# Patient Record
Sex: FEMALE | Race: WHITE | HISPANIC OR LATINO | Employment: FULL TIME | URBAN - METROPOLITAN AREA
[De-identification: names, ages, dates, MRNs, and addresses within clinical notes are randomized per-mention and may not be internally consistent; named-entity substitution may affect disease eponyms.]

---

## 2017-01-12 ENCOUNTER — OFFICE VISIT (OUTPATIENT)
Dept: ENDOCRINOLOGY | Facility: CLINIC | Age: 47
End: 2017-01-12
Payer: COMMERCIAL

## 2017-01-12 VITALS
WEIGHT: 167.56 LBS | BODY MASS INDEX: 26.3 KG/M2 | DIASTOLIC BLOOD PRESSURE: 70 MMHG | SYSTOLIC BLOOD PRESSURE: 110 MMHG | HEIGHT: 67 IN | HEART RATE: 80 BPM

## 2017-01-12 DIAGNOSIS — I10 ESSENTIAL HYPERTENSION: ICD-10-CM

## 2017-01-12 DIAGNOSIS — E04.2 MULTINODULAR GOITER: Primary | ICD-10-CM

## 2017-01-12 PROCEDURE — 99214 OFFICE O/P EST MOD 30 MIN: CPT | Mod: S$GLB,,, | Performed by: INTERNAL MEDICINE

## 2017-01-12 PROCEDURE — 3074F SYST BP LT 130 MM HG: CPT | Mod: S$GLB,,, | Performed by: INTERNAL MEDICINE

## 2017-01-12 PROCEDURE — 99999 PR PBB SHADOW E&M-EST. PATIENT-LVL III: CPT | Mod: PBBFAC,,, | Performed by: INTERNAL MEDICINE

## 2017-01-12 PROCEDURE — 1159F MED LIST DOCD IN RCRD: CPT | Mod: S$GLB,,, | Performed by: INTERNAL MEDICINE

## 2017-01-12 PROCEDURE — 3078F DIAST BP <80 MM HG: CPT | Mod: S$GLB,,, | Performed by: INTERNAL MEDICINE

## 2017-01-12 NOTE — PROGRESS NOTES
CHIEF COMPLAINT: Multinodular Goiter  45 year old being seen as a f/u. No difficulty swallowing. No change in voice      PAST MEDICAL HISTORY/PAST SURGICAL HISTORY:  Reviewed in EPIC    SOCIAL HISTORY: Smokes 1/2 ppd. No alcohol. 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 No DM.    MEDICATIONS/ALLERGIES: The patient's MedCard has been updated and reviewed.      ROS:   Constitutional: No recent significant weight change  Eyes: No recent visual changes  ENT: No dysphagia  Cardiovascular:Has had a holter for palpitations.   Respiratory: Denies current respiratory difficulty  Gastrointestinal: Denies recent bowel disturbances.   GenitoUrinary - No dysuria  Skin: No new skin rash  Neurologic: No focal neurologic complaints  Remainder ROS negative        PE:    GENERAL: Well developed, well nourished.  PSYCH:  appropriate mood and affect  EYES:  PERRL, EOM intact.  ENT: Nares patent, oropharynx clear, mucosa pink,   NECK: Supple, trachea midline, no palpable nodules  CHEST: Resp even and unlabored, CTA bilateral.  CARDIAC: RRR, S1, S2 heard, no murmurs, rubs, S3, or S4  ABDOMEN: Soft, non-tender, non-distended;  No organomegaly  VASCULAR:  DP pulses +2/4 bilaterally, no edema  NEURO: Gait steady, CN II-VII grossly intact  SKIN: No areas of breakdown, no acanthosis nigracans.      THYROID US:  ULTRASOUND THYROID 12/28/2016 at 1209    CPT: 54537    Clinical data:  Clinical history is provided of hypertension, goiter    Comparison: 12/21/2015    Ultrasound of the thyroid was performed.    Findings: The isthmus measures 0.17 cm. The right thyroid lobe is 3.7 x 1.3 x 1.2 cm. The left thyroid lobe is 4.2 x 0.8 x 1 cm. The thyroid echotexture is subtly heterogeneous with symmetric color Doppler flow bilaterally.  Multinodular changes are present similarly.  Measurements include hypoechoic nodule right superiorly 4 x 6 x 4 mm with well-defined margins similar, cyst right 6 x 5 x 6 mm similar,  left cyst anteriorly 4 x 5 x 3 mm similar, mixed hypoechoic cystic nodule left inferiorly 5 x 8 x 4 mm similar measured currently as well as smaller adjacent. Correlate overall with the patient's clinical findings and laboratory values. No significant interval changes are appreciated.       Impression      Multinodular, cystic goiter changes are present similar overall as compared to the previous study measured currently.  One of the previously visualized nodules on the right is not appreciated currently as compared to the previous study.         Results for ESTELLA DAVID (MRN [id number]) as of 1/12/2017 16:02   Ref. Range 12/17/2016 09:58   TSH Latest Ref Range: 0.400 - 4.000 uIU/mL 0.800         ASSESSMENT/PLAN:  1. Multinodular Goiter- TSH WNL. No significant change in size of nodules. F/U 1 year with TSH, thyroid US    2. HTN- controlled.     FOLLOWUP  F/u 1 year with TSH, Thyroid US

## 2017-12-22 ENCOUNTER — TELEPHONE (OUTPATIENT)
Dept: NEUROLOGY | Facility: CLINIC | Age: 47
End: 2017-12-22

## 2017-12-22 NOTE — TELEPHONE ENCOUNTER
----- Message from Sofia Katz sent at 12/22/2017  9:47 AM CST -----  Schedule a new patient appointment.  Please call patient at [phone]

## 2017-12-26 ENCOUNTER — TELEPHONE (OUTPATIENT)
Dept: NEUROLOGY | Facility: CLINIC | Age: 47
End: 2017-12-26

## 2017-12-26 NOTE — TELEPHONE ENCOUNTER
----- Message from Nate Monge sent at 12/26/2017  4:16 PM CST -----  Contact: patient  Place call to pod,Patient returning call.please call back at [phone]. Thanks,

## 2017-12-26 NOTE — TELEPHONE ENCOUNTER
Attempted to reach by patient by both her contacts without avail. Left messabe on contacts voice mail.

## 2017-12-26 NOTE — TELEPHONE ENCOUNTER
Returned call and spoke with patient. Appointment scheduled for 2/22 at 8 am. Directions given to [address] [place], [place] [address]. Patient verbalized understanding.

## 2017-12-27 ENCOUNTER — TELEPHONE (OUTPATIENT)
Dept: ENDOCRINOLOGY | Facility: CLINIC | Age: 47
End: 2017-12-27

## 2018-01-15 ENCOUNTER — PATIENT MESSAGE (OUTPATIENT)
Dept: ENDOCRINOLOGY | Facility: CLINIC | Age: 48
End: 2018-01-15

## 2018-01-16 DIAGNOSIS — E04.2 MULTINODULAR GOITER: Primary | ICD-10-CM

## 2018-02-05 ENCOUNTER — OFFICE VISIT (OUTPATIENT)
Dept: ENDOCRINOLOGY | Facility: CLINIC | Age: 48
End: 2018-02-05
Payer: COMMERCIAL

## 2018-02-05 VITALS
BODY MASS INDEX: 27.14 KG/M2 | SYSTOLIC BLOOD PRESSURE: 138 MMHG | HEART RATE: 70 BPM | WEIGHT: 172.94 LBS | DIASTOLIC BLOOD PRESSURE: 78 MMHG | HEIGHT: 67 IN

## 2018-02-05 DIAGNOSIS — I10 BENIGN ESSENTIAL HTN: ICD-10-CM

## 2018-02-05 DIAGNOSIS — E04.2 MULTINODULAR GOITER: Primary | ICD-10-CM

## 2018-02-05 PROCEDURE — 99999 PR PBB SHADOW E&M-EST. PATIENT-LVL III: CPT | Mod: PBBFAC,,, | Performed by: INTERNAL MEDICINE

## 2018-02-05 PROCEDURE — 99213 OFFICE O/P EST LOW 20 MIN: CPT | Mod: S$GLB,,, | Performed by: INTERNAL MEDICINE

## 2018-02-05 PROCEDURE — 3008F BODY MASS INDEX DOCD: CPT | Mod: S$GLB,,, | Performed by: INTERNAL MEDICINE

## 2018-02-05 NOTE — PROGRESS NOTES
CHIEF COMPLAINT: Multinodular Goiter  48 year old being seen as a f/u. No difficulty swallowing. No change in voice. No palpitations. No tremors. Had the flu last week.       PAST MEDICAL HISTORY/PAST SURGICAL HISTORY:  Reviewed in [facility]    SOCIAL HISTORY: Smokes 1/2 ppd. No alcohol. 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 No DM.    MEDICATIONS/ALLERGIES: The patient's MedCard has been updated and reviewed.      ROS:   Constitutional: No recent significant weight change  Eyes: No recent visual changes  ENT: No dysphagia  Cardiovascular:Has had a holter for palpitations.   Respiratory: Denies current respiratory difficulty  Gastrointestinal: Denies recent bowel disturbances.   GenitoUrinary - No dysuria  Skin: No new skin rash  Neurologic: No focal neurologic complaints  Remainder ROS negative        PE:    GENERAL: Well developed, well nourished.  NECK: Supple, trachea midline, no palpable nodules  CHEST: Resp even and unlabored, CTA bilateral.  CARDIAC: RRR, S1, S2 heard, no murmurs, rubs, S3, or S4    Results for ESTELLA DAVID (MRN [id number]) as of 2/5/2018 16:06   Ref. Range 12/26/2017 11:11   TSH Latest Ref Range: 0.400 - 4.000 uIU/mL 1.420     THYROID US:  Ultrasound thyroid    Indication: Followup nodules.    Comparison: 12/28/2016    Findings: The right thyroid lobe measures 4.1 x 1.4 x 1.2 cm.  Left lower lobe measures 3.3 x 0.8 x 1.1 cm.  The thyroid isthmus measures 2 mm in thickness.  The thyroid gland is homogenous in echotexture.  There is a 5 x 6 x 3 mm cystic nodule in the superior portion of the right thyroid lobe.  There is a solid 6 x 5 x 3 mm nodule in the inferior pole of the right thyroid lobe.  There is a 4 x 5 x 4 mm cystic nodule in the mid right thyroid lobe.  There is a 5 x 6 x 3 mm cystic nodule in the left mid thyroid lobe.  There is a 5 x 8 x 4 mm solid nodule in the inferior left thyroid lobe.  These are not significantly changed when compared to  prior.   Impression      Stable subcentimeter thyroid nodules.           ASSESSMENT/PLAN:  1. Multinodular Goiter- TSH WNL. No significant change in size of nodules. F/U 2 year with TSH, thyroid US    2. HTN- controlled.     FOLLOWUP  F/u 2 year with TSH, Thyroid US

## 2018-02-19 ENCOUNTER — TELEPHONE (OUTPATIENT)
Dept: NEUROLOGY | Facility: CLINIC | Age: 48
End: 2018-02-19

## 2018-02-19 NOTE — TELEPHONE ENCOUNTER
Called patient and left message in regards to rescheduling appointment. Patient was scheduled on 2/22/18 but Dr. Bhandari will not be in that day. Left message informing patient I was going to reschedule appointment to 3/2/18. Stated in message that if this was a problem and they could not be seen on that day just to call us back and id be happy to reschedule them to another day.

## 2018-02-19 NOTE — TELEPHONE ENCOUNTER
----- Message from Caesar Rider sent at 2/19/2018  2:38 PM CST -----  Contact: self   Patient want to speak with Katherine regarding rescheduling her appointment, patient stated she needs something the first week in April. Please call back at [phone]

## 2018-04-06 ENCOUNTER — OFFICE VISIT (OUTPATIENT)
Dept: NEUROLOGY | Facility: CLINIC | Age: 48
End: 2018-04-06
Payer: COMMERCIAL

## 2018-04-06 VITALS
DIASTOLIC BLOOD PRESSURE: 80 MMHG | RESPIRATION RATE: 18 BRPM | SYSTOLIC BLOOD PRESSURE: 120 MMHG | HEART RATE: 79 BPM | HEIGHT: 67 IN | WEIGHT: 177.38 LBS | BODY MASS INDEX: 27.84 KG/M2

## 2018-04-06 DIAGNOSIS — Z86.2 HISTORY OF IRON DEFICIENCY ANEMIA: ICD-10-CM

## 2018-04-06 DIAGNOSIS — R93.0 ABNORMAL MRI OF HEAD: Primary | ICD-10-CM

## 2018-04-06 DIAGNOSIS — K44.9 HIATAL HERNIA: ICD-10-CM

## 2018-04-06 DIAGNOSIS — F41.9 ANXIETY: ICD-10-CM

## 2018-04-06 DIAGNOSIS — E61.1 IRON DEFICIENCY: ICD-10-CM

## 2018-04-06 DIAGNOSIS — E04.2 MULTINODULAR GOITER: ICD-10-CM

## 2018-04-06 DIAGNOSIS — E55.9 VITAMIN D DEFICIENCY: ICD-10-CM

## 2018-04-06 DIAGNOSIS — I10 ESSENTIAL HYPERTENSION: ICD-10-CM

## 2018-04-06 PROCEDURE — 99204 OFFICE O/P NEW MOD 45 MIN: CPT | Mod: S$GLB,,, | Performed by: PSYCHIATRY & NEUROLOGY

## 2018-04-06 PROCEDURE — 3074F SYST BP LT 130 MM HG: CPT | Mod: CPTII,S$GLB,, | Performed by: PSYCHIATRY & NEUROLOGY

## 2018-04-06 PROCEDURE — 3079F DIAST BP 80-89 MM HG: CPT | Mod: CPTII,S$GLB,, | Performed by: PSYCHIATRY & NEUROLOGY

## 2018-04-06 PROCEDURE — 99999 PR PBB SHADOW E&M-EST. PATIENT-LVL III: CPT | Mod: PBBFAC,,, | Performed by: PSYCHIATRY & NEUROLOGY

## 2018-04-06 NOTE — PROGRESS NOTES
Headache questionnaire    1. When did your Headaches start?    2013      2. Where are your headaches located?   left      3. Your headache's characteristics:   Throbbing, Pounding    4. How long does the headache last?   (Patient did not answer)      5. How often does the headache occur?   daily      6. Are your headaches preceded or accompanied by other symptoms? yes   If yes, please describe.  Anxiety, numbness      7. Does the headache awaken you at night? no   If so, how often? N/A        8. Please jabari the word that best describes your headache's intensity:    (Patient did not answer)      9. Using a scale of 1 through 10, with 0 = no pain and 10 = the worst pain:   What score is your headache now? 0   What score is your headache at its worst? 6   What score is your headache at its best? 0        10. Possible associated headache symptoms:  [x]  Sensitivity to light  [] Dizziness  [x] Nasal or sinus pressure/ pain   [x] Sensitivity to noise  [] Vertigo  [] Problems with concentration  [x] Sensitivity to smells  [] Ringing in ears  [] Problems with memory    [x] Blurred vision  [x] Irritability  [] Problems with task completion   [] Double vision  [] Anger  []  Problems with relaxation  [] Loss of appetite  [x] Anxiety  [x] Neck tightness, Neck pain  [] Nausea   [x] Nasal congestion  [] Vomiting         11. Headache improving factors:  [x] Sleep  [] Heat  [] Darkness  [] Ice  [] Local pressure [] Menses (period)  [x] Massage   [] Medications:        12. Headache worsening factors:   [x] Fatigue [] Sneezing  [] Changes in Weather  [] Light [] Bending Over [] Stress  [] Noise [] Ovulation  [] Multiple Sclerosis Flare-Up  [] Smells  [] Menses  [] Food   [] Coughing [] Alcohol      13. Number of caffeinated drinks per day: 2      14. Number of diet drinks per day:  0      15. Have you seen any other [facility]ists within the last 3 years?  no

## 2018-04-06 NOTE — PROGRESS NOTES
Subjective:       Patient ID: Jomar Mercer is a 48 y.o. female.    Chief Complaint: Migraine    HPI  The patient is a pleasant 49 y/o female presenting with chief complaint of left supraorbital, high frontal throbbing sensation without pain. She also feels a mild sensation of numbness. The symptoms are present about 30% of the time untreated and about 10% of the time with the application of essential oils. She has suffered with this since 2013, followed at [facility] and diagnosed with Migraine. She comes with a CD containing an MRI and MRA of the brain for review. I have also reviewed previous records from [facility]. She has had Cardiac work up including a stress test and an 2D ECHO both within normal limits. She has history of hypertension and is on Propranolol 10 mg BID. She admits that stress is a trigger as well as fatigue. She is a  at [employer]. Her PMHx is significant for a thyroid nodule, subcentimeter, followed by Dr. Mora. She is a smoker, trying to quit planning on starting Chantix. She has tried Gabapentin, Clonazepam, Cymbalta, and Elavil. Currently on low dose Clonazepam for anxiety  Please see details of headache characteristics below:  Headache questionnaire     1. When did your Headaches start?               2013        2. Where are your headaches located?              left        3. Your headache's characteristics:              Throbbing, Pounding     4. How long does the headache last?              (Patient did not answer)        5. How often does the headache occur?              daily        6. Are your headaches preceded or accompanied by other symptoms? yes              If yes, please describe.  Anxiety, numbness        7. Does the headache awaken you at night? no              If so, how often? N/A           8. Please jabari the word that best describes your headache's intensity:               (Patient did not answer)        9. Using a scale of 1 through 10, with 0 = no  pain and 10 = the worst pain:              What score is your headache now? 0              What score is your headache at its worst? 6              What score is your headache at its best? 0           10. Possible associated headache symptoms:  [x]  Sensitivity to light                  [] Dizziness                    [x] Nasal or sinus pressure/ pain              [x] Sensitivity to noise                 [] Vertigo                        [] Problems with concentration  [x] Sensitivity to smells   [] Ringing in ears           [] Problems with memory                        [x] Blurred vision             [x] Irritability                     [] Problems with task completion   [] Double vision             [] Anger              []  Problems with relaxation  [] Loss of appetite                     [x] Anxiety                       [x] Neck tightness, Neck pain  [] Nausea                                   [x] Nasal congestion  [] Vomiting                                         11. Headache improving factors:  [x] Sleep              [] Heat  [] Darkness                    [] Ice  [] Local pressure           [] Menses (period)  [x] Massage                    [] Medications:          12. Headache worsening factors:   [x] Fatigue           [] Sneezing                    [] Changes in Weather  [] Light   [] Bending Over [] Stress  [] Noise  [] Ovulation                    [] Multiple Sclerosis Flare-Up  [] Smells            [] Menses                      [] Food   [] Coughing        [] Alcohol        13. Number of caffeinated drinks per day: 2        14. Number of diet drinks per day:  0          Review of Systems   Constitutional: Positive for fatigue. Negative for activity change, appetite change and fever.   HENT: Negative for congestion, dental problem, hearing loss, sinus pressure, tinnitus, trouble swallowing and voice change.    Eyes: Negative for photophobia, pain, redness and visual disturbance.   Respiratory: Negative  "for cough, chest tightness and shortness of breath.    Cardiovascular: Positive for palpitations. Negative for chest pain and leg swelling.   Gastrointestinal: Negative for abdominal pain, blood in stool, nausea and vomiting.   Endocrine: Negative for cold intolerance and heat intolerance.   Genitourinary: Negative for difficulty urinating, frequency, menstrual problem and urgency.   Musculoskeletal: Negative for arthralgias, back pain, gait problem, joint swelling, myalgias, neck pain and neck stiffness.   Skin: Negative.    Neurological: Positive for numbness. Negative for dizziness, tremors, seizures, syncope, facial asymmetry, speech difficulty, weakness, light-headedness and headaches.   Hematological: Negative for adenopathy. Does not bruise/bleed easily.   Psychiatric/Behavioral: Negative for agitation, behavioral problems, confusion, decreased concentration, self-injury, sleep disturbance and suicidal ideas. The patient is not nervous/anxious and is not hyperactive.                Past Medical History:   Diagnosis Date    a Family H/O CAD     "SEs q5yr At Age 50"    a Palpitations     Dr. Roge Johnson 2015 Evaluation Was Negative    b Hypertension     5/9/16 RXd Low Salt Diet    d Family H/O DM     e Multinodular Goiter     Dr. Evan Mora Monitors With Yearly U/S    g Chronic Iron Deficiency     12/27/17 RXd An OTC "MVI With Iron" 1 Tab Daily; Likely From Menstrual Loss    g H/O Iron Deficiency Anemia     Her CBCs Are Normal On Daily PO Iron; Likely From Menstrual Loss    i 1/2 PPD X 20 YRs TUD     5/9/16 RXd OTC 2 Mg Nicotine Gum PRN    j Gallstone     7/26/16 Referred To Dr. Selma Crane, But She Is Deferring Sx For Now; 4/20/15 ABD U/S = 1 Mobile Gallstone W/O Inflammation    j GERD     Dr. Yovanny Robison; 6/8/15 EGD (Dr. MERNA Chun) = Normal Except For A Small Hiatal Hernia    j Mildly Elevated ALT Levels     Dr. Yovanny Robison; 12/23/16 RXd OTC Vitamin E 400 IU Daily; 5/27/16 HepABC And Ferritin " = Normal/Not Elevated    j Small Hiatal Hernia     Dr. Yovanny Robison; 6/8/15 EGD (Dr. MERNA Chun) = Normal Except For A Small Hiatal Hernia    m Migraines     16 Referred To Dr. Colin Carreon For 2nd Opinion; Dr. Marino ([facility] [place]); On Clonazepam And Elavil For This    n Anxiety     On Clonazepam And Elavil For This    o Allergic Rhinosinusitis     q Vitamin D Deficiency     16 Increased Her OTC D3 To 5K IU Daily    Wellness Visit 2017      Past Surgical History:   Procedure Laterality Date     SECTION       Family History   Problem Relation Age of Onset    Stroke Mother     Hypertension Mother     Stroke Father     Heart disease Father     Hypertension Father     Kidney disease Father     Cancer Paternal Aunt      breast    Stroke Maternal Grandmother     Heart disease Paternal Grandfather      Social History     Social History    Marital status:      Spouse name: N/A    Number of children: N/A    Years of education: N/A     Occupational History    Not on file.     Social History Main Topics    Smoking status: Former Smoker     Packs/day: 0.25     Years: 25.00     Quit date: 2018    Smokeless tobacco: Never Used    Alcohol use No    Drug use: No    Sexual activity: Not on file     Other Topics Concern    Not on file     Social History Narrative    No narrative on file     Review of patient's allergies indicates:   Allergen Reactions    No known drug allergies        Current Outpatient Prescriptions:     clonazePAM (KLONOPIN) 0.5 MG tablet, Take 0.5 tablets (0.25 mg total) by mouth every evening., Disp: 45 tablet, Rfl: 1    fluticasone (FLONASE) 50 mcg/actuation nasal spray, SPRAY 2 SPRAYS IN EACH NOSTRIL ONCE DAILY, Disp: 54 g, Rfl: 1    levocetirizine (XYZAL) 5 MG tablet, TAKE 1 TABLET (5 MG TOTAL) BY MOUTH AS NEEDED FOR ALLERGIES., Disp: 90 tablet, Rfl: 1    pantoprazole (PROTONIX) 40 MG tablet, Take 40 mg by mouth once daily.,  Disp: , Rfl: 2    propranolol (INDERAL) 10 MG tablet, Take 1 tablet (10 mg total) by mouth 2 (two) times daily., Disp: 180 tablet, Rfl: 1    sod bicarb-sod chlor-neti pot pkdv, 1 Bottle by Nasal route 2 (two) times daily., Disp: , Rfl:     iron-FA-K44-L-nlitafc sodium 93-4--50 mg-mg-mcg-mg-mg Tab, Take 1 tablet by mouth once daily., Disp: 90 tablet, Rfl: 0    multivitamin with iron Tab, Take 1 tablet by mouth once daily., Disp: , Rfl: 0      Objective:      Vitals:    04/06/18 0825   BP: 120/80   Pulse: 79   Resp: 18     Body mass index is 27.78 kg/m².    Physical Exam    Constitutional:   She appears well-developed and well-nourished. She is well groomed    HENT:    Head: Atraumatic, oral and nasal mucosa intact  Eyes: Conjunctivae and EOM are normal. Pupils are equal, round, and reactive to light OU  Neck: Neck supple. No thyromegaly present  Cardiovascular: Normal rate and normal heart sounds  No murmur heard  Pulmonary/Chest: Effort normal and breath sounds normal  Musculoskeletal: Normal range of motion. No joint stiffness. No vertebral point tenderness  Skin: Skin is warm and dry  Psychiatric: Normal mood and affect     Neuro exam:    Mental status:  Awake, attentive, Alert, oriented to self, place, year and month  Language function is intact    Cranial Nerves:  Smell was not formally evaluated  Cranial Nerves II - XII: intact  Pursuits were smooth, normal saccades, no nystagmus OU  Motor - facial movement was symmetrical and normal     Palate moved well and was symmetrical with normal palatal and oral sensation  Tongue movements were full    Coordination:     Rapid alternating movements and rapid finger tapping - normal bilaterally  Finger to nose - normal and symmetric bilaterally   Heel to shin test - normal and symmetric bilaterally   Arm roll - smooth and symmetric   No intentional or positional tremor.     Motor:  Normal muscle bulk and symmetry. No fasciculations were noted    No pronator  drift  Strength 5/5 bilaterally     Reflexes:  Tendon reflexes were 3 + at biceps, triceps, brachioradialis, patellar, and Achilles bilaterally  On plantar stimulation toes were down going bilaterally  No clonus was noted     Sensory:Decreased 20% to pp on the left hemibody including the face.     Gait: Romberg absent. Normal gait. Normal arm swing and turns. Good tandem    Review of Data:  Lab Results   Component Value Date     12/26/2017     06/18/2015    K 3.8 12/26/2017    K 4.2 06/18/2015    MG 2.1 06/18/2015     12/26/2017     06/18/2015    CO2 27 12/26/2017    BUN 14 12/26/2017    CREATININE 0.70 12/26/2017    CREATININE 0.72 06/18/2015    GLU 90 12/26/2017    HGBA1C 5.2 12/26/2017    AST 19 12/26/2017    AST 20 03/21/2016    ALT 32 12/26/2017    ALBUMIN 4.1 12/26/2017    ALBUMIN 4.3 06/18/2015    PROT 7.0 12/26/2017    BILITOT 0.6 12/26/2017    CHOL 196 12/26/2017    HDL 68 12/26/2017    LDLCALC 112.6 12/26/2017    TRIG 77 12/26/2017       Lab Results   Component Value Date    WBC 4.93 12/26/2017    HGB 11.8 (L) 12/26/2017    HCT 37.6 12/26/2017    MCV 86 12/26/2017     12/26/2017       Lab Results   Component Value Date    TSH 1.420 12/26/2017    TSH 1.420 12/26/2017     I reviewed images of MRA and MRI of the brain and shared the images with the patient. There are multiple foci of white matter lesions likely representing gliosis.        Assessment and Plan   The patient has a headache NOS but with multiple migraine features like the throbbing nature of the symptoms and the associated photophobia, phonophobia, osmophobia (highly specific for migraine), improvement with sleep.  She has a negative cardiac work up. Given the MRI findings, I will obtain a hypercoagulable work up although the history of hypertension, smoking and migraine type headache could explain these findings.  Start 81 mg ASA after blood drawn for hypercoagulable work up  Anxiety, on clonazepam  Sub centimeter  thyroid nodule, followed by Dr. Mora  Hypertension, on propranolol 10 mg BID  RTC in 3 months          Loree Bhandari M.D  Medical Director, Headache and Facial Pain  [facility]

## 2018-04-06 NOTE — LETTER
April 6, 2018      Chris Medrano MD  [id number]  [address]  [place] [id number]           [facility] [place]  [address] [facility] [address] [place] [address]  Phone: [phone]  Fax: [phone]          Patient: Jomar Mercer   MR Number: [id number]   YOB: 1970   Date of Visit: 4/6/2018       Dear Dr. Chris Medrano:    Thank you for referring Jomar Mercer to me for evaluation. Attached you will find relevant portions of my assessment and plan of care.    If you have questions, please do not hesitate to call me. I look forward to following Jomar Mercer along with you.    Sincerely,    Nila Bhandari MD    Enclosure  CC:  No Recipients    If you would like to receive this communication electronically, please contact [email or web address] or [phone] to request more information on [employer] access.    For providers and/or their staff who would like to refer a patient to [facility], please contact us through our one-stop-shop provider referral line, [facility], at [phone].    If you feel you have received this communication in error or would no longer like to receive these types of communications, please e-mail [email or web address]

## 2018-07-10 ENCOUNTER — OFFICE VISIT (OUTPATIENT)
Dept: NEUROLOGY | Facility: CLINIC | Age: 48
End: 2018-07-10
Payer: COMMERCIAL

## 2018-07-10 VITALS
RESPIRATION RATE: 17 BRPM | WEIGHT: 176.56 LBS | DIASTOLIC BLOOD PRESSURE: 78 MMHG | SYSTOLIC BLOOD PRESSURE: 121 MMHG | HEART RATE: 75 BPM | BODY MASS INDEX: 27.71 KG/M2 | HEIGHT: 67 IN

## 2018-07-10 DIAGNOSIS — K44.9 HIATAL HERNIA: ICD-10-CM

## 2018-07-10 DIAGNOSIS — I10 ESSENTIAL HYPERTENSION: ICD-10-CM

## 2018-07-10 DIAGNOSIS — G43.019 INTRACTABLE MIGRAINE WITHOUT AURA AND WITHOUT STATUS MIGRAINOSUS: Primary | ICD-10-CM

## 2018-07-10 DIAGNOSIS — F41.9 ANXIETY: ICD-10-CM

## 2018-07-10 DIAGNOSIS — E04.2 MULTINODULAR GOITER: ICD-10-CM

## 2018-07-10 DIAGNOSIS — Z86.2 HISTORY OF IRON DEFICIENCY ANEMIA: ICD-10-CM

## 2018-07-10 DIAGNOSIS — E55.9 VITAMIN D DEFICIENCY: ICD-10-CM

## 2018-07-10 PROCEDURE — 3078F DIAST BP <80 MM HG: CPT | Mod: CPTII,S$GLB,, | Performed by: PSYCHIATRY & NEUROLOGY

## 2018-07-10 PROCEDURE — 99214 OFFICE O/P EST MOD 30 MIN: CPT | Mod: S$GLB,,, | Performed by: PSYCHIATRY & NEUROLOGY

## 2018-07-10 PROCEDURE — 99999 PR PBB SHADOW E&M-EST. PATIENT-LVL III: CPT | Mod: PBBFAC,,, | Performed by: PSYCHIATRY & NEUROLOGY

## 2018-07-10 PROCEDURE — 3074F SYST BP LT 130 MM HG: CPT | Mod: CPTII,S$GLB,, | Performed by: PSYCHIATRY & NEUROLOGY

## 2018-07-10 PROCEDURE — 3008F BODY MASS INDEX DOCD: CPT | Mod: CPTII,S$GLB,, | Performed by: PSYCHIATRY & NEUROLOGY

## 2018-07-10 RX ORDER — CYPROHEPTADINE HYDROCHLORIDE 4 MG/1
TABLET ORAL
Qty: 30 TABLET | Refills: 5 | Status: SHIPPED | OUTPATIENT
Start: 2018-07-10 | End: 2018-10-04 | Stop reason: SDUPTHER

## 2018-07-10 NOTE — PROGRESS NOTES
Subjective:       Patient ID: Jomar Mercer is a 48 y.o. female.    Chief Complaint: Migraine  INTERVAL HISTORY  The patient comes for follow up. She is a little worse. She is on vacation from school and finds that not working is actually associated with increased headaches. She is on Klonopin 0.25 mg QHS for sleep and is willing to come off it. She is here to discuss options.  HPI  The patient is a pleasant 47 y/o female presenting with chief complaint of left supraorbital, high frontal throbbing sensation without pain. She also feels a mild sensation of numbness. The symptoms are present about 30% of the time untreated and about 10% of the time with the application of essential oils. She has suffered with this since 2013, followed at [facility] and diagnosed with Migraine. She comes with a CD containing an MRI and MRA of the brain for review. I have also reviewed previous records from [facility]. She has had Cardiac work up including a stress test and an 2D ECHO both within normal limits. She has history of hypertension and is on Propranolol 10 mg BID. She admits that stress is a trigger as well as fatigue. She is a  at [employer]. Her PMHx is significant for a thyroid nodule, subcentimeter, followed by Dr. Mora. She is a smoker, trying to quit planning on starting Chantix. She has tried Gabapentin, Clonazepam, Cymbalta, and Elavil. Currently on low dose Clonazepam for anxiety  Please see details of headache characteristics below:  Headache questionnaire     1. When did your Headaches start?               2013        2. Where are your headaches located?              left        3. Your headache's characteristics:              Throbbing, Pounding     4. How long does the headache last?              (Patient did not answer)        5. How often does the headache occur?              daily        6. Are your headaches preceded or accompanied by other symptoms? yes              If yes, please  describe.  Anxiety, numbness        7. Does the headache awaken you at night? no              If so, how often? N/A           8. Please jabari the word that best describes your headache's intensity:               (Patient did not answer)        9. Using a scale of 1 through 10, with 0 = no pain and 10 = the worst pain:              What score is your headache now? 0              What score is your headache at its worst? 6              What score is your headache at its best? 0           10. Possible associated headache symptoms:  [x]  Sensitivity to light                  [] Dizziness                    [x] Nasal or sinus pressure/ pain              [x] Sensitivity to noise                 [] Vertigo                        [] Problems with concentration  [x] Sensitivity to smells   [] Ringing in ears           [] Problems with memory                        [x] Blurred vision             [x] Irritability                     [] Problems with task completion   [] Double vision             [] Anger              []  Problems with relaxation  [] Loss of appetite                     [x] Anxiety                       [x] Neck tightness, Neck pain  [] Nausea                                   [x] Nasal congestion  [] Vomiting                                         11. Headache improving factors:  [x] Sleep              [] Heat  [] Darkness                    [] Ice  [] Local pressure           [] Menses (period)  [x] Massage                    [] Medications:          12. Headache worsening factors:   [x] Fatigue           [] Sneezing                    [] Changes in Weather  [] Light   [] Bending Over [] Stress  [] Noise  [] Ovulation                    [] Multiple Sclerosis Flare-Up  [] Smells            [] Menses                      [] Food   [] Coughing        [] Alcohol        13. Number of caffeinated drinks per day: 2        14. Number of diet drinks per day:  0          Review of Systems   Constitutional: Positive for  "fatigue. Negative for activity change, appetite change and fever.   HENT: Negative for congestion, dental problem, hearing loss, sinus pressure, tinnitus, trouble swallowing and voice change.    Eyes: Negative for photophobia, pain, redness and visual disturbance.   Respiratory: Negative for cough, chest tightness and shortness of breath.    Cardiovascular: Positive for palpitations. Negative for chest pain and leg swelling.   Gastrointestinal: Negative for abdominal pain, blood in stool, nausea and vomiting.   Endocrine: Negative for cold intolerance and heat intolerance.   Genitourinary: Negative for difficulty urinating, frequency, menstrual problem and urgency.   Musculoskeletal: Negative for arthralgias, back pain, gait problem, joint swelling, myalgias, neck pain and neck stiffness.   Skin: Negative.    Neurological: Positive for numbness. Negative for dizziness, tremors, seizures, syncope, facial asymmetry, speech difficulty, weakness, light-headedness and headaches.   Hematological: Negative for adenopathy. Does not bruise/bleed easily.   Psychiatric/Behavioral: Negative for agitation, behavioral problems, confusion, decreased concentration, self-injury, sleep disturbance and suicidal ideas. The patient is not nervous/anxious and is not hyperactive.                Past Medical History:   Diagnosis Date    a Family H/O CAD     "SEs q5yr At Age 50"    a Palpitations     Dr. Roge Johnson 2015 Evaluation Was Negative    b Hypertension     5/9/16 RXd Low Salt Diet    d Family H/O DM     e Multinodular Goiter     Dr. Evan Mora Monitors With Yearly U/S    g Chronic Iron Deficiency     12/27/17 RXd An OTC "MVI With Iron" 1 Tab Daily; Likely From Menstrual Loss    g H/O Iron Deficiency Anemia     Her CBCs Are Normal On Daily PO Iron; Likely From Menstrual Loss    i 1/2 PPD X 20 YRs TUD     5/9/16 RXd OTC 2 Mg Nicotine Gum PRN    j Gallstone     7/26/16 Referred To Dr. Selma Crane, But She Is Deferring Sx " For Now; 4/20/15 ABD U/S = 1 Mobile Gallstone W/O Inflammation    j GERD     Dr. Yovanny Robison; 6/8/15 EGD (Dr. MERNA Chun) = Normal Except For A Small Hiatal Hernia    j Mildly Elevated ALT Levels     Dr. Yovanny Robison; 16 RXd OTC Vitamin E 400 IU Daily; 16 HepABC And Ferritin = Normal/Not Elevated    j Small Hiatal Hernia     Dr. Yovanny Robison; 6/8/15 EGD (Dr. MERNA Chun) = Normal Except For A Small Hiatal Hernia    m Migraines     16 Referred To Dr. Colin Carreon For 2nd Opinion; Dr. Marino ([facility] [place]); On Clonazepam And Elavil For This    n Anxiety     On Clonazepam And Elavil For This    o Allergic Rhinosinusitis     q Vitamin D Deficiency     16 Increased Her OTC D3 To 5K IU Daily    Wellness Visit 2017      Past Surgical History:   Procedure Laterality Date     SECTION       Family History   Problem Relation Age of Onset    Stroke Mother     Hypertension Mother     Stroke Father     Heart disease Father     Hypertension Father     Kidney disease Father     Cancer Paternal Aunt      breast    Stroke Maternal Grandmother     Heart disease Paternal Grandfather      Social History     Social History    Marital status:      Spouse name: N/A    Number of children: N/A    Years of education: N/A     Occupational History    Not on file.     Social History Main Topics    Smoking status: Former Smoker     Packs/day: 0.25     Years: 25.00     Quit date: 2018    Smokeless tobacco: Never Used    Alcohol use No    Drug use: No    Sexual activity: Not on file     Other Topics Concern    Not on file     Social History Narrative    No narrative on file     Review of patient's allergies indicates:   Allergen Reactions    No known drug allergies        Current Outpatient Prescriptions:     clonazePAM (KLONOPIN) 0.5 MG tablet, Take 0.5 tablets (0.25 mg total) by mouth every evening., Disp: 45 tablet, Rfl: 1    fluticasone (FLONASE) 50  mcg/actuation nasal spray, SPRAY 2 SPRAYS IN EACH NOSTRIL ONCE DAILY, Disp: 54 g, Rfl: 1    levocetirizine (XYZAL) 5 MG tablet, TAKE 1 TABLET (5 MG TOTAL) BY MOUTH AS NEEDED FOR ALLERGIES., Disp: 90 tablet, Rfl: 1    pantoprazole (PROTONIX) 40 MG tablet, Take 40 mg by mouth once daily., Disp: , Rfl: 2    propranolol (INDERAL) 10 MG tablet, Take 1 tablet (10 mg total) by mouth 2 (two) times daily., Disp: 180 tablet, Rfl: 1    sod bicarb-sod chlor-neti pot pkdv, 1 Bottle by Nasal route 2 (two) times daily., Disp: , Rfl:     iron-FA-J30-G-dxaoyvt sodium 79-2--50 mg-mg-mcg-mg-mg Tab, Take 1 tablet by mouth once daily., Disp: 90 tablet, Rfl: 0    multivitamin with iron Tab, Take 1 tablet by mouth once daily., Disp: , Rfl: 0      Objective:      Vitals:    07/10/18 1434   BP: 121/78   Pulse: 75   Resp: 17     Body mass index is 27.66 kg/m².      Physical Exam    Constitutional:   She appears well-developed and well-nourished. She is well groomed    HENT:    Head: Atraumatic, oral and nasal mucosa intact  Eyes: Conjunctivae and EOM are normal. Pupils are equal, round, and reactive to light OU  Neck: Neck supple. No thyromegaly present  Cardiovascular: Normal rate and normal heart sounds  No murmur heard  Pulmonary/Chest: Effort normal and breath sounds normal  Musculoskeletal: Normal range of motion. No joint stiffness. No vertebral point tenderness  Skin: Skin is warm and dry  Psychiatric: Normal mood and affect     Neuro exam:    Mental status:  Awake, attentive, Alert, oriented to self, place, year and month  Language function is intact    Cranial Nerves:  Smell was not formally evaluated  Cranial Nerves II - XII: intact  Pursuits were smooth, normal saccades, no nystagmus OU  Motor - facial movement was symmetrical and normal     Palate moved well and was symmetrical with normal palatal and oral sensation  Tongue movements were full    Coordination:     Rapid alternating movements and rapid finger tapping -  normal bilaterally  Finger to nose - normal and symmetric bilaterally   Heel to shin test - normal and symmetric bilaterally   Arm roll - smooth and symmetric   No intentional or positional tremor.     Motor:  Normal muscle bulk and symmetry. No fasciculations were noted    No pronator drift  Strength 5/5 bilaterally     Reflexes:  Tendon reflexes were 3 + at biceps, triceps, brachioradialis, patellar, and Achilles bilaterally  On plantar stimulation toes were down going bilaterally  No clonus was noted     Sensory:Decreased 20% to pp on the left hemibody including the face.     Gait: Romberg absent. Normal gait. Normal arm swing and turns. Good tandem    Review of Data:  Lab Results   Component Value Date     12/26/2017     06/18/2015    K 3.8 12/26/2017    K 4.2 06/18/2015    MG 2.1 06/18/2015     12/26/2017     06/18/2015    CO2 27 12/26/2017    BUN 14 12/26/2017    CREATININE 0.70 12/26/2017    CREATININE 0.72 06/18/2015    GLU 90 12/26/2017    HGBA1C 5.2 12/26/2017    AST 19 12/26/2017    AST 20 03/21/2016    ALT 32 12/26/2017    ALBUMIN 4.1 12/26/2017    ALBUMIN 4.3 06/18/2015    PROT 7.0 12/26/2017    BILITOT 0.6 12/26/2017    CHOL 196 12/26/2017    HDL 68 12/26/2017    LDLCALC 112.6 12/26/2017    TRIG 77 12/26/2017       Lab Results   Component Value Date    WBC 4.93 12/26/2017    HGB 11.8 (L) 12/26/2017    HCT 37.6 12/26/2017    MCV 86 12/26/2017     12/26/2017       Lab Results   Component Value Date    TSH 1.420 12/26/2017    TSH 1.420 12/26/2017     I reviewed images of MRA and MRI of the brain and shared the images with the patient. There are multiple foci of white matter lesions likely representing gliosis.  Hypercoag work up, also entirely negative      Assessment and Plan   The patient has a headache NOS but with multiple migraine features like the throbbing nature of the symptoms and the associated photophobia, phonophobia, osmophobia (highly specific for migraine),  improvement with sleep.  Will start tapering off Klonopin by taking 0.125 mg for 1 to 2 weeks.  Start Periactin 2 mg po QHS increasing to 4 mg po QHS as tolerated  She has a negative cardiac work up. Hypercoagulable work up entirely negative  Start 81 mg ASA after blood drawn for hypercoagulable work up  Anxiety, on clonazepam  Sub centimeter thyroid nodule, followed by Dr. Mora  Hypertension, on propranolol 10 mg BID  RTC in 6 months          Loree Bhandari M.D  Medical Director, Headache and Facial Pain  [facility] [place]

## 2018-07-12 ENCOUNTER — PATIENT MESSAGE (OUTPATIENT)
Dept: NEUROLOGY | Facility: CLINIC | Age: 48
End: 2018-07-12

## 2018-08-06 ENCOUNTER — PATIENT MESSAGE (OUTPATIENT)
Dept: NEUROLOGY | Facility: CLINIC | Age: 48
End: 2018-08-06

## 2018-08-22 ENCOUNTER — TELEPHONE (OUTPATIENT)
Dept: FAMILY MEDICINE | Facility: CLINIC | Age: 48
End: 2018-08-22

## 2018-08-22 NOTE — TELEPHONE ENCOUNTER
----- Message from Donyblanca Tereso sent at 8/22/2018  2:38 PM CDT -----  Contact: self   Type:  Sooner Apoointment Request    Caller is requesting a sooner appointment.  Caller declined first available appointment listed below.  Caller will not accept being placed on the waitlist and is requesting a message be sent to doctor.    Name of Caller: patient   When is the first available appointment? 9/19  Symptoms: high blood pressure   Best Call Back Number: [phone]     Additional Information: patient is requesting to be seen on 9/10 or 9/12 mornings, she will be a np

## 2018-08-22 NOTE — TELEPHONE ENCOUNTER
Spoke w/ pt , pt wants to establish care/ htn. Pt sched 9-12-18. Offered pt a sooner appt with another provider, pt refused. She will wait for Dr Rodríguez.--lp

## 2018-08-29 ENCOUNTER — TELEPHONE (OUTPATIENT)
Dept: ADMINISTRATIVE | Facility: HOSPITAL | Age: 48
End: 2018-08-29

## 2018-09-12 ENCOUNTER — PATIENT MESSAGE (OUTPATIENT)
Dept: NEUROLOGY | Facility: CLINIC | Age: 48
End: 2018-09-12

## 2018-09-12 ENCOUNTER — TELEPHONE (OUTPATIENT)
Dept: NEUROLOGY | Facility: CLINIC | Age: 48
End: 2018-09-12

## 2018-09-12 NOTE — TELEPHONE ENCOUNTER
Returned patients call in regards to sooner appointment. Appointment rescheduled to a sooner date, patient added to wait list. Verbalized understanding.

## 2018-09-12 NOTE — TELEPHONE ENCOUNTER
----- Message from Marlen Mckeon sent at 9/12/2018  8:29 AM CDT -----  Contact: Patient  Type:  Same Day Appointment Request    Caller is requesting a same day appointment.  Caller declined first available appointment listed below.      Name of Caller:  Jomar  When is the first available appointment?  11/7/18  Symptoms:  Headahce  Best Call Back Number: [phone]  Additional Information:

## 2018-10-04 ENCOUNTER — OFFICE VISIT (OUTPATIENT)
Dept: NEUROLOGY | Facility: CLINIC | Age: 48
End: 2018-10-04
Payer: COMMERCIAL

## 2018-10-04 VITALS
HEART RATE: 74 BPM | BODY MASS INDEX: 28.88 KG/M2 | SYSTOLIC BLOOD PRESSURE: 139 MMHG | RESPIRATION RATE: 16 BRPM | WEIGHT: 184 LBS | DIASTOLIC BLOOD PRESSURE: 85 MMHG | HEIGHT: 67 IN

## 2018-10-04 DIAGNOSIS — G43.019 INTRACTABLE MIGRAINE WITHOUT AURA AND WITHOUT STATUS MIGRAINOSUS: Primary | ICD-10-CM

## 2018-10-04 DIAGNOSIS — I10 ESSENTIAL HYPERTENSION: ICD-10-CM

## 2018-10-04 DIAGNOSIS — F41.9 ANXIETY: ICD-10-CM

## 2018-10-04 DIAGNOSIS — E61.1 IRON DEFICIENCY: ICD-10-CM

## 2018-10-04 PROCEDURE — 99214 OFFICE O/P EST MOD 30 MIN: CPT | Mod: S$GLB,,, | Performed by: PSYCHIATRY & NEUROLOGY

## 2018-10-04 PROCEDURE — 99999 PR PBB SHADOW E&M-EST. PATIENT-LVL III: CPT | Mod: PBBFAC,,, | Performed by: PSYCHIATRY & NEUROLOGY

## 2018-10-04 PROCEDURE — 3079F DIAST BP 80-89 MM HG: CPT | Mod: CPTII,S$GLB,, | Performed by: PSYCHIATRY & NEUROLOGY

## 2018-10-04 PROCEDURE — 3008F BODY MASS INDEX DOCD: CPT | Mod: CPTII,S$GLB,, | Performed by: PSYCHIATRY & NEUROLOGY

## 2018-10-04 PROCEDURE — 3075F SYST BP GE 130 - 139MM HG: CPT | Mod: CPTII,S$GLB,, | Performed by: PSYCHIATRY & NEUROLOGY

## 2018-10-04 RX ORDER — CYPROHEPTADINE HYDROCHLORIDE 4 MG/1
TABLET ORAL
Qty: 30 TABLET | Refills: 5 | Status: SHIPPED | OUTPATIENT
Start: 2018-10-04 | End: 2018-12-26

## 2018-10-04 NOTE — PROGRESS NOTES
Subjective:       Patient ID: Jomar Mercer is a 48 y.o. female.    Chief Complaint: Migraine  INTERVAL HISTORY  The patient comes for follow up. She is still the throbbing sensation in the left side of the head, typically precipitated by smoking a cigarette. She is on Klonopin 0.125 mg QHS for sleep. She is here to discuss options.    HPI  The patient is a pleasant 47 y/o female presenting with chief complaint of left supraorbital, high frontal throbbing sensation without pain. She also feels a mild sensation of numbness. The symptoms are present about 30% of the time untreated and about 10% of the time with the application of essential oils. She has suffered with this since 2013, followed at [facility] and diagnosed with Migraine. She comes with a CD containing an MRI and MRA of the brain for review. I have also reviewed previous records from [place]. She has had Cardiac work up including a stress test and an 2D ECHO both within normal limits. She has history of hypertension and is on Propranolol 10 mg BID. She admits that stress is a trigger as well as fatigue. She is a  at [employer]. Her PMHx is significant for a thyroid nodule, subcentimeter, followed by Dr. Mora. She is a smoker, trying to quit planning on starting Chantix. She has tried Gabapentin, Clonazepam, Cymbalta, and Elavil. Currently on low dose Clonazepam for anxiety  Please see details of headache characteristics below:  Headache questionnaire     1. When did your Headaches start?               2013        2. Where are your headaches located?              left        3. Your headache's characteristics:              Throbbing, Pounding     4. How long does the headache last?              (Patient did not answer)        5. How often does the headache occur?              daily        6. Are your headaches preceded or accompanied by other symptoms? yes              If yes, please describe.  Anxiety, numbness        7. Does the  headache awaken you at night? no              If so, how often? N/A           8. Please jabari the word that best describes your headache's intensity:               (Patient did not answer)        9. Using a scale of 1 through 10, with 0 = no pain and 10 = the worst pain:              What score is your headache now? 0              What score is your headache at its worst? 6              What score is your headache at its best? 0           10. Possible associated headache symptoms:  [x]  Sensitivity to light                  [] Dizziness                    [x] Nasal or sinus pressure/ pain              [x] Sensitivity to noise                 [] Vertigo                        [] Problems with concentration  [x] Sensitivity to smells   [] Ringing in ears           [] Problems with memory                        [x] Blurred vision             [x] Irritability                     [] Problems with task completion   [] Double vision             [] Anger              []  Problems with relaxation  [] Loss of appetite                     [x] Anxiety                       [x] Neck tightness, Neck pain  [] Nausea                                   [x] Nasal congestion  [] Vomiting                                         11. Headache improving factors:  [x] Sleep              [] Heat  [] Darkness                    [] Ice  [] Local pressure           [] Menses (period)  [x] Massage                    [] Medications:          12. Headache worsening factors:   [x] Fatigue           [] Sneezing                    [] Changes in Weather  [] Light   [] Bending Over [] Stress  [] Noise  [] Ovulation                    [] Multiple Sclerosis Flare-Up  [] Smells            [] Menses                      [] Food   [] Coughing        [] Alcohol        13. Number of caffeinated drinks per day: 2        14. Number of diet drinks per day:  0          Review of Systems   Constitutional: Positive for fatigue. Negative for activity change, appetite  "change and fever.   HENT: Negative for congestion, dental problem, hearing loss, sinus pressure, tinnitus, trouble swallowing and voice change.    Eyes: Negative for photophobia, pain, redness and visual disturbance.   Respiratory: Negative for cough, chest tightness and shortness of breath.    Cardiovascular: Positive for palpitations. Negative for chest pain and leg swelling.   Gastrointestinal: Negative for abdominal pain, blood in stool, nausea and vomiting.   Endocrine: Negative for cold intolerance and heat intolerance.   Genitourinary: Negative for difficulty urinating, frequency, menstrual problem and urgency.   Musculoskeletal: Negative for arthralgias, back pain, gait problem, joint swelling, myalgias, neck pain and neck stiffness.   Skin: Negative.    Neurological: Positive for numbness. Negative for dizziness, tremors, seizures, syncope, facial asymmetry, speech difficulty, weakness, light-headedness and headaches.   Hematological: Negative for adenopathy. Does not bruise/bleed easily.   Psychiatric/Behavioral: Negative for agitation, behavioral problems, confusion, decreased concentration, self-injury, sleep disturbance and suicidal ideas. The patient is not nervous/anxious and is not hyperactive.                Past Medical History:   Diagnosis Date    a Family H/O CAD     "SEs q5yr At Age 50"    a Palpitations     Dr. Roge Johnson 2015 Evaluation Was Negative    b Hypertension     5/9/16 RXd Low Salt Diet    d Family H/O DM     e Multinodular Goiter     Dr. Evan Mora Monitors With Yearly U/S    g Chronic Iron Deficiency     12/27/17 RXd An OTC "MVI With Iron" 1 Tab Daily; Likely From Menstrual Loss    g H/O Iron Deficiency Anemia     Her CBCs Are Normal On Daily PO Iron; Likely From Menstrual Loss    i 1/2 PPD X 20 YRs TUD     5/9/16 RXd OTC 2 Mg Nicotine Gum PRN    j Gallstone     7/26/16 Referred To Dr. Selma Crane, But She Is Deferring Sx For Now; 4/20/15 ABD U/S = 1 Mobile Gallstone " W/O Inflammation    j GERD     Dr. Yovanny Robison; 6/8/15 EGD (Dr. MERNA Chun) = Normal Except For A Small Hiatal Hernia    j Mildly Elevated ALT Levels     Dr. Yovanny Robison; 16 RXd OTC Vitamin E 400 IU Daily; 16 HepABC And Ferritin = Normal/Not Elevated    j Small Hiatal Hernia     Dr. Yovanny Robison; 6/8/15 EGD (Dr. MERNA Chun) = Normal Except For A Small Hiatal Hernia    m Migraines     16 Referred To Dr. Colin Carreon For 2nd Opinion; Dr. Marino ([facility] [place]); On Clonazepam And Elavil For This    n Anxiety     On Clonazepam And Elavil For This    o Allergic Rhinosinusitis     q Vitamin D Deficiency     16 Increased Her OTC D3 To 5K IU Daily    Wellness Visit 2017      Past Surgical History:   Procedure Laterality Date     SECTION       Family History   Problem Relation Age of Onset    Stroke Mother     Hypertension Mother     Stroke Father     Heart disease Father     Hypertension Father     Kidney disease Father     Cancer Paternal Aunt      breast    Stroke Maternal Grandmother     Heart disease Paternal Grandfather      Social History     Social History    Marital status:      Spouse name: N/A    Number of children: N/A    Years of education: N/A     Occupational History    Not on file.     Social History Main Topics    Smoking status: Former Smoker     Packs/day: 0.25     Years: 25.00     Quit date: 2018    Smokeless tobacco: Never Used    Alcohol use No    Drug use: No    Sexual activity: Not on file     Other Topics Concern    Not on file     Social History Narrative    No narrative on file     Review of patient's allergies indicates:   Allergen Reactions    No known drug allergies          Current Outpatient Medications   Medication Sig    azelastine (ASTELIN) 137 mcg (0.1 %) nasal spray 1 spray (137 mcg total) by Nasal route 2 (two) times daily.    clonazePAM (KLONOPIN) 0.5 MG tablet TAKE 1/2 TABLET EVERY EVENING     cyproheptadine (PERIACTIN) 4 mg tablet Take 1/2 tab po nightly and increase to full tablet if tolerated    fluticasone (FLONASE) 50 mcg/actuation nasal spray SPRAY 2 SPRAYS IN EACH NOSTRIL ONCE DAILY    iron-FA-N83-[id number] sodium 49-2--50 mg-mg-mcg-mg-mg Tab Take 1 tablet by mouth once daily.    levocetirizine (XYZAL) 5 MG tablet TAKE 1 TABLET (5 MG TOTAL) BY MOUTH AS NEEDED FOR ALLERGIES.    multivitamin with iron Tab Take 1 tablet by mouth once daily.    pantoprazole (PROTONIX) 40 MG tablet Take 40 mg by mouth once daily.    propranolol (INDERAL) 10 MG tablet Take 1 tablet (10 mg total) by mouth 2 (two) times daily.    sod bicarb-sod chlor-neti pot pkdv 1 Bottle by Nasal route 2 (two) times daily.     No current facility-administered medications for this visit.            Objective:      Vitals:    10/04/18 1428   BP: 139/85   Pulse: 74   Resp: 16     Body mass index is 28.81 kg/m².      Physical Exam    Constitutional:   She appears well-developed and well-nourished. She is well groomed    HENT:    Head: Atraumatic, oral and nasal mucosa intact  Eyes: Conjunctivae and EOM are normal. Pupils are equal, round, and reactive to light OU  Neck: Neck supple. No thyromegaly present  Cardiovascular: Normal rate and normal heart sounds  No murmur heard  Pulmonary/Chest: Effort normal and breath sounds normal  Musculoskeletal: Normal range of motion. No joint stiffness. No vertebral point tenderness  Skin: Skin is warm and dry  Psychiatric: Normal mood and affect     Neuro exam:    Mental status:  Awake, attentive, Alert, oriented to self, place, year and month  Language function is intact    Cranial Nerves:  Smell was not formally evaluated  Cranial Nerves II - XII: intact  Pursuits were smooth, normal saccades, no nystagmus OU  Motor - facial movement was symmetrical and normal     Palate moved well and was symmetrical with normal palatal and oral sensation  Tongue movements were full    Coordination:      Rapid alternating movements and rapid finger tapping - normal bilaterally  Finger to nose - normal and symmetric bilaterally   Heel to shin test - normal and symmetric bilaterally   Arm roll - smooth and symmetric   No intentional or positional tremor.     Motor:  Normal muscle bulk and symmetry. No fasciculations were noted    No pronator drift  Strength 5/5 bilaterally     Reflexes:  Tendon reflexes were 3 + at biceps, triceps, brachioradialis, patellar, and Achilles bilaterally  On plantar stimulation toes were down going bilaterally  No clonus was noted     Sensory:Decreased 20% to pp on the left hemibody including the face.     Gait: Romberg absent. Normal gait. Normal arm swing and turns. Good tandem    Review of Data:  Lab Results   Component Value Date     12/26/2017     06/18/2015    K 3.8 12/26/2017    K 4.2 06/18/2015    MG 2.1 06/18/2015     12/26/2017     06/18/2015    CO2 27 12/26/2017    BUN 14 12/26/2017    CREATININE 0.70 12/26/2017    CREATININE 0.72 06/18/2015    GLU 90 12/26/2017    HGBA1C 5.2 12/26/2017    AST 19 12/26/2017    AST 20 03/21/2016    ALT 32 12/26/2017    ALBUMIN 4.1 12/26/2017    ALBUMIN 4.3 06/18/2015    PROT 7.0 12/26/2017    BILITOT 0.6 12/26/2017    CHOL 196 12/26/2017    HDL 68 12/26/2017    LDLCALC 112.6 12/26/2017    TRIG 77 12/26/2017       Lab Results   Component Value Date    WBC 4.93 12/26/2017    HGB 11.8 (L) 12/26/2017    HCT 37.6 12/26/2017    MCV 86 12/26/2017     12/26/2017       Lab Results   Component Value Date    TSH 1.420 12/26/2017    TSH 1.420 12/26/2017     I reviewed images of MRA and MRI of the brain and shared the images with the patient. There are multiple foci of white matter lesions likely representing gliosis.  Hypercoag work up, also entirely negative      Assessment and Plan   The patient has a headache NOS but with multiple migraine features like the throbbing nature of the symptoms and the associated photophobia,  phonophobia, osmophobia (highly specific for migraine), improvement with sleep.  Will start tapering off Klonopin by taking 0.125 mg for 1 to 2 weeks.  Restart Periactin 2 mg po QHS increasing to 4 mg po QHS, she was doing very well while taking it  She has a negative cardiac work up. Hypercoagulable work up entirely negative  Anxiety, on clonazepam  Sub centimeter thyroid nodule, followed by Dr. Mora  Hypertension, on propranolol 10 mg BID  RTC in 6 months  Counseling:  More than 50% of the 25 minute encounter was spent face to face counseling the patient regarding current status and future plan of care as well as side of the medications. All questions were answered to patient's satisfaction          Loree Bhandari M.D  Medical Director, Headache and Facial Pain  [facility]

## 2018-12-05 ENCOUNTER — TELEPHONE (OUTPATIENT)
Dept: NEUROLOGY | Facility: CLINIC | Age: 48
End: 2018-12-05

## 2018-12-05 NOTE — TELEPHONE ENCOUNTER
Called patient to reschedule her appointment due to provider being out of the clinic. Appointment successfully rescheduled. Informed patient of directions and location of facility , and to arrive 15 minutes early . Patient verbalized understanding.

## 2019-01-22 ENCOUNTER — OFFICE VISIT (OUTPATIENT)
Dept: NEUROLOGY | Facility: CLINIC | Age: 49
End: 2019-01-22
Payer: COMMERCIAL

## 2019-01-22 VITALS
DIASTOLIC BLOOD PRESSURE: 75 MMHG | WEIGHT: 177.94 LBS | HEART RATE: 68 BPM | HEIGHT: 67 IN | BODY MASS INDEX: 27.93 KG/M2 | SYSTOLIC BLOOD PRESSURE: 115 MMHG

## 2019-01-22 DIAGNOSIS — E61.1 IRON DEFICIENCY: ICD-10-CM

## 2019-01-22 DIAGNOSIS — K44.9 HIATAL HERNIA: ICD-10-CM

## 2019-01-22 DIAGNOSIS — G43.019 INTRACTABLE MIGRAINE WITHOUT AURA AND WITHOUT STATUS MIGRAINOSUS: Primary | ICD-10-CM

## 2019-01-22 DIAGNOSIS — F41.9 ANXIETY: ICD-10-CM

## 2019-01-22 DIAGNOSIS — I10 ESSENTIAL HYPERTENSION: ICD-10-CM

## 2019-01-22 DIAGNOSIS — E04.2 MULTINODULAR GOITER: ICD-10-CM

## 2019-01-22 PROCEDURE — 99215 OFFICE O/P EST HI 40 MIN: CPT | Mod: S$GLB,,, | Performed by: PSYCHIATRY & NEUROLOGY

## 2019-01-22 PROCEDURE — 99215 PR OFFICE/OUTPT VISIT, EST, LEVL V, 40-54 MIN: ICD-10-PCS | Mod: S$GLB,,, | Performed by: PSYCHIATRY & NEUROLOGY

## 2019-01-22 PROCEDURE — 3078F PR MOST RECENT DIASTOLIC BLOOD PRESSURE < 80 MM HG: ICD-10-PCS | Mod: CPTII,S$GLB,, | Performed by: PSYCHIATRY & NEUROLOGY

## 2019-01-22 PROCEDURE — 3078F DIAST BP <80 MM HG: CPT | Mod: CPTII,S$GLB,, | Performed by: PSYCHIATRY & NEUROLOGY

## 2019-01-22 PROCEDURE — 3008F BODY MASS INDEX DOCD: CPT | Mod: CPTII,S$GLB,, | Performed by: PSYCHIATRY & NEUROLOGY

## 2019-01-22 PROCEDURE — 3074F SYST BP LT 130 MM HG: CPT | Mod: CPTII,S$GLB,, | Performed by: PSYCHIATRY & NEUROLOGY

## 2019-01-22 PROCEDURE — 3074F PR MOST RECENT SYSTOLIC BLOOD PRESSURE < 130 MM HG: ICD-10-PCS | Mod: CPTII,S$GLB,, | Performed by: PSYCHIATRY & NEUROLOGY

## 2019-01-22 PROCEDURE — 3008F PR BODY MASS INDEX (BMI) DOCUMENTED: ICD-10-PCS | Mod: CPTII,S$GLB,, | Performed by: PSYCHIATRY & NEUROLOGY

## 2019-01-22 PROCEDURE — 99999 PR PBB SHADOW E&M-EST. PATIENT-LVL III: ICD-10-PCS | Mod: PBBFAC,,, | Performed by: PSYCHIATRY & NEUROLOGY

## 2019-01-22 PROCEDURE — 99999 PR PBB SHADOW E&M-EST. PATIENT-LVL III: CPT | Mod: PBBFAC,,, | Performed by: PSYCHIATRY & NEUROLOGY

## 2019-01-22 RX ORDER — CLONAZEPAM 0.5 MG/1
TABLET ORAL
Qty: 45 TABLET | Refills: 0 | Status: SHIPPED | OUTPATIENT
Start: 2019-01-22 | End: 2019-05-20 | Stop reason: SDUPTHER

## 2019-01-22 NOTE — PROGRESS NOTES
"Subjective:       Patient ID: Jomar Mercer is a 48 y.o. female.    Chief Complaint: Migraine  INTERVAL HISTORY  The patient comes for follow up. She is still the throbbing sensation in the left side of the head, typically precipitated by smoking a cigarette and when exposed to bright lights. She is into "holistic" medications. Recently tried "DAYLIN." She prefers non pharmacological interventions. She is on Klonopin 0.125 mg QHS for sleep. She is here to discuss options.    HPI  The patient is a pleasant 47 y/o female presenting with chief complaint of left supraorbital, high frontal throbbing sensation without pain. She also feels a mild sensation of numbness. The symptoms are present about 30% of the time untreated and about 10% of the time with the application of essential oils. She has suffered with this since 2013, followed at [facility] and diagnosed with Migraine. She comes with a CD containing an MRI and MRA of the brain for review. I have also reviewed previous records from [place]. She has had Cardiac work up including a stress test and an 2D ECHO both within normal limits. She has history of hypertension and is on Propranolol 10 mg BID. She admits that stress is a trigger as well as fatigue. She is a  at [employer]. Her PMHx is significant for a thyroid nodule, subcentimeter, followed by Dr. Mora. She is a smoker, trying to quit planning on starting Chantix. She has tried Gabapentin, Clonazepam, Cymbalta, and Elavil. Currently on low dose Clonazepam for anxiety  Please see details of headache characteristics below:  Headache questionnaire     1. When did your Headaches start?               2013        2. Where are your headaches located?              left        3. Your headache's characteristics:              Throbbing, Pounding     4. How long does the headache last?              (Patient did not answer)        5. How often does the headache occur?              daily        6. " Are your headaches preceded or accompanied by other symptoms? yes              If yes, please describe.  Anxiety, numbness        7. Does the headache awaken you at night? no              If so, how often? N/A           8. Please jabari the word that best describes your headache's intensity:               (Patient did not answer)        9. Using a scale of 1 through 10, with 0 = no pain and 10 = the worst pain:              What score is your headache now? 0              What score is your headache at its worst? 6              What score is your headache at its best? 0           10. Possible associated headache symptoms:  [x]  Sensitivity to light                  [] Dizziness                    [x] Nasal or sinus pressure/ pain              [x] Sensitivity to noise                 [] Vertigo                        [] Problems with concentration  [x] Sensitivity to smells   [] Ringing in ears           [] Problems with memory                        [x] Blurred vision             [x] Irritability                     [] Problems with task completion   [] Double vision             [] Anger              []  Problems with relaxation  [] Loss of appetite                     [x] Anxiety                       [x] Neck tightness, Neck pain  [] Nausea                                   [x] Nasal congestion  [] Vomiting                                         11. Headache improving factors:  [x] Sleep              [] Heat  [] Darkness                    [] Ice  [] Local pressure           [] Menses (period)  [x] Massage                    [] Medications:          12. Headache worsening factors:   [x] Fatigue           [] Sneezing                    [] Changes in Weather  [] Light   [] Bending Over [] Stress  [] Noise  [] Ovulation                    [] Multiple Sclerosis Flare-Up  [] Smells            [] Menses                      [] Food   [] Coughing        [] Alcohol        13. Number of caffeinated drinks per day:  "2        14. Number of diet drinks per day:  0          Review of Systems   Constitutional: Positive for fatigue. Negative for activity change, appetite change and fever.   HENT: Negative for congestion, dental problem, hearing loss, sinus pressure, tinnitus, trouble swallowing and voice change.    Eyes: Negative for photophobia, pain, redness and visual disturbance.   Respiratory: Negative for cough, chest tightness and shortness of breath.    Cardiovascular: Positive for palpitations. Negative for chest pain and leg swelling.   Gastrointestinal: Negative for abdominal pain, blood in stool, nausea and vomiting.   Endocrine: Negative for cold intolerance and heat intolerance.   Genitourinary: Negative for difficulty urinating, frequency, menstrual problem and urgency.   Musculoskeletal: Negative for arthralgias, back pain, gait problem, joint swelling, myalgias, neck pain and neck stiffness.   Skin: Negative.    Neurological: Positive for numbness. Negative for dizziness, tremors, seizures, syncope, facial asymmetry, speech difficulty, weakness, light-headedness and headaches.   Hematological: Negative for adenopathy. Does not bruise/bleed easily.   Psychiatric/Behavioral: Negative for agitation, behavioral problems, confusion, decreased concentration, self-injury, sleep disturbance and suicidal ideas. The patient is not nervous/anxious and is not hyperactive.                Past Medical History:   Diagnosis Date    a Family H/O CAD     "SEs q5yr At Age 50"    a Palpitations     Dr. Roge Johnson 2015 Evaluation Was Negative    b Hypertension     5/9/16 RXd Low Salt Diet    d Family H/O DM     e Multinodular Goiter     Dr. Evan Mora Monitors With Yearly U/S    g Chronic Iron Deficiency     12/27/17 RXd An OTC "MVI With Iron" 1 Tab Daily; Likely From Menstrual Loss    g H/O Iron Deficiency Anemia     Her CBCs Are Normal On Daily PO Iron; Likely From Menstrual Loss    i 1/2 PPD X 20 YRs TUD     5/9/16 RXd OTC " 2 Mg Nicotine Gum PRN    j Gallstone     16 Referred To Dr. Selma Crane, But She Is Deferring Sx For Now; 4/20/15 ABD U/S = 1 Mobile Gallstone W/O Inflammation    j GERD     Dr. Yovanny Robison; 6/8/15 EGD (Dr. MERNA Chun) = Normal Except For A Small Hiatal Hernia    j Mildly Elevated ALT Levels     Dr. Yovanny Robison; 16 RXd OTC Vitamin E 400 IU Daily; 16 HepABC And Ferritin = Normal/Not Elevated    j Small Hiatal Hernia     Dr. Yovanny Robison; 6/8/15 EGD (Dr. MERNA Chun) = Normal Except For A Small Hiatal Hernia    m Migraines     16 Referred To Dr. Colin Carreon For 2nd Opinion; Dr. Marino ([facility] [place]); On Clonazepam And Elavil For This    n Anxiety     On Clonazepam And Elavil For This    o Allergic Rhinosinusitis     q Vitamin D Deficiency     16 Increased Her OTC D3 To 5K IU Daily    Wellness Visit 2017      Past Surgical History:   Procedure Laterality Date     SECTION       Family History   Problem Relation Age of Onset    Stroke Mother     Hypertension Mother     Stroke Father     Heart disease Father     Hypertension Father     Kidney disease Father     Cancer Paternal Aunt      breast    Stroke Maternal Grandmother     Heart disease Paternal Grandfather      Social History     Social History    Marital status:      Spouse name: N/A    Number of children: N/A    Years of education: N/A     Occupational History    Not on file.     Social History Main Topics    Smoking status: Former Smoker     Packs/day: 0.25     Years: 25.00     Quit date: 2018    Smokeless tobacco: Never Used    Alcohol use No    Drug use: No    Sexual activity: Not on file     Other Topics Concern    Not on file     Social History Narrative    No narrative on file     Review of patient's allergies indicates:   Allergen Reactions    No known drug allergies          Current Outpatient Medications   Medication Sig    azelastine (ASTELIN) 137 mcg  (0.1 %) nasal spray 1 spray (137 mcg total) by Nasal route 2 (two) times daily.    clonazePAM (KLONOPIN) 0.5 MG tablet TAKE 1/2 TABLET EVERY EVENING    cyproheptadine (PERIACTIN) 4 mg tablet Take 1/2 tab po nightly and increase to full tablet if tolerated    fluticasone (FLONASE) 50 mcg/actuation nasal spray SPRAY 2 SPRAYS IN EACH NOSTRIL ONCE DAILY    iron-[id number] sodium 51-6--50 mg-mg-mcg-mg-mg Tab Take 1 tablet by mouth once daily.    levocetirizine (XYZAL) 5 MG tablet TAKE 1 TABLET (5 MG TOTAL) BY MOUTH AS NEEDED FOR ALLERGIES.    multivitamin with iron Tab Take 1 tablet by mouth once daily.    pantoprazole (PROTONIX) 40 MG tablet Take 40 mg by mouth once daily.    propranolol (INDERAL) 10 MG tablet Take 1 tablet (10 mg total) by mouth 2 (two) times daily.    sod bicarb-sod chlor-neti pot pkdv 1 Bottle by Nasal route 2 (two) times daily.     No current facility-administered medications for this visit.            Objective:      Vitals:    01/22/19 1503   BP: 115/75   Pulse: 68     Body mass index is 27.86 kg/m².    Physical Exam    Constitutional:   She appears well-developed and well-nourished. She is well groomed    HENT:    Head: Atraumatic, oral and nasal mucosa intact  Eyes: Conjunctivae and EOM are normal. Pupils are equal, round, and reactive to light OU  Neck: Neck supple. No thyromegaly present  Cardiovascular: Normal rate and normal heart sounds  No murmur heard  Pulmonary/Chest: Effort normal and breath sounds normal  Musculoskeletal: Normal range of motion. No joint stiffness. No vertebral point tenderness  Skin: Skin is warm and dry  Psychiatric: Normal mood and affect     Neuro exam:    Mental status:  Awake, attentive, Alert, oriented to self, place, year and month  Language function is intact    Cranial Nerves:  Smell was not formally evaluated  Cranial Nerves II - XII: intact  Pursuits were smooth, normal saccades, no nystagmus OU  Motor - facial movement was symmetrical  and normal     Palate moved well and was symmetrical with normal palatal and oral sensation  Tongue movements were full    Coordination:     Rapid alternating movements and rapid finger tapping - normal bilaterally  Finger to nose - normal and symmetric bilaterally   Heel to shin test - normal and symmetric bilaterally   Arm roll - smooth and symmetric   No intentional or positional tremor.     Motor:  Normal muscle bulk and symmetry. No fasciculations were noted    No pronator drift  Strength 5/5 bilaterally     Reflexes:  Tendon reflexes were 3 + at biceps, triceps, brachioradialis, patellar, and Achilles bilaterally  On plantar stimulation toes were down going bilaterally  No clonus was noted     Sensory:Decreased 20% to pp on the left hemibody including the face.     Gait: Romberg absent. Normal gait. Normal arm swing and turns. Good tandem    Review of Data:  Lab Results   Component Value Date     12/26/2017     06/18/2015    K 3.8 12/26/2017    K 4.2 06/18/2015    MG 2.1 06/18/2015     12/26/2017     06/18/2015    CO2 27 12/26/2017    BUN 14 12/26/2017    CREATININE 0.70 12/26/2017    CREATININE 0.72 06/18/2015    GLU 90 12/26/2017    HGBA1C 5.2 12/26/2017    AST 19 12/26/2017    AST 20 03/21/2016    ALT 32 12/26/2017    ALBUMIN 4.1 12/26/2017    ALBUMIN 4.3 06/18/2015    PROT 7.0 12/26/2017    BILITOT 0.6 12/26/2017    CHOL 196 12/26/2017    HDL 68 12/26/2017    LDLCALC 112.6 12/26/2017    TRIG 77 12/26/2017       Lab Results   Component Value Date    WBC 4.93 12/26/2017    HGB 11.8 (L) 12/26/2017    HCT 37.6 12/26/2017    MCV 86 12/26/2017     12/26/2017       Lab Results   Component Value Date    TSH 1.420 12/26/2017    TSH 1.420 12/26/2017     I reviewed images of MRA and MRI of the brain and shared the images with the patient. There are multiple foci of white matter lesions likely representing gliosis.  Hypercoag work up, also entirely negative      Assessment and Plan   The  patient has a headache NOS but with multiple migraine features like the throbbing nature of the symptoms and the associated photophobia, phonophobia, osmophobia (highly specific for migraine), improvement with sleep.  Continue Klonopin, last RX for 45 tabs of 0.5 mg has lasted until now and she is still has some  Periactin 2 mg po QHS poorly tolerated.  She has a negative cardiac work up. Hypercoagulable work up entirely negative  Sub centimeter thyroid nodule, followed by Dr. Mora  Hypertension, on propranolol 10 mg BID  RTC in 6 months  Counseling:  More than 50% of the 25 minute encounter was spent face to face counseling the patient regarding current status and future plan of care as well as side of the medications. All questions were answered to patient's satisfaction          Loree Bhandari M.D  Medical Director, Headache and Facial Pain  [facility]

## 2019-05-20 ENCOUNTER — OFFICE VISIT (OUTPATIENT)
Dept: NEUROLOGY | Facility: CLINIC | Age: 49
End: 2019-05-20
Payer: COMMERCIAL

## 2019-05-20 VITALS
SYSTOLIC BLOOD PRESSURE: 138 MMHG | HEART RATE: 77 BPM | DIASTOLIC BLOOD PRESSURE: 81 MMHG | WEIGHT: 180 LBS | BODY MASS INDEX: 28.19 KG/M2 | RESPIRATION RATE: 18 BRPM

## 2019-05-20 DIAGNOSIS — F41.9 ANXIETY: ICD-10-CM

## 2019-05-20 DIAGNOSIS — I10 ESSENTIAL HYPERTENSION: ICD-10-CM

## 2019-05-20 DIAGNOSIS — E55.9 VITAMIN D DEFICIENCY: ICD-10-CM

## 2019-05-20 DIAGNOSIS — G43.019 INTRACTABLE MIGRAINE WITHOUT AURA AND WITHOUT STATUS MIGRAINOSUS: Primary | ICD-10-CM

## 2019-05-20 DIAGNOSIS — E61.1 IRON DEFICIENCY: ICD-10-CM

## 2019-05-20 DIAGNOSIS — E04.2 MULTINODULAR GOITER: ICD-10-CM

## 2019-05-20 PROCEDURE — 3008F BODY MASS INDEX DOCD: CPT | Mod: CPTII,S$GLB,, | Performed by: PSYCHIATRY & NEUROLOGY

## 2019-05-20 PROCEDURE — 99215 OFFICE O/P EST HI 40 MIN: CPT | Mod: S$GLB,,, | Performed by: PSYCHIATRY & NEUROLOGY

## 2019-05-20 PROCEDURE — 3079F DIAST BP 80-89 MM HG: CPT | Mod: CPTII,S$GLB,, | Performed by: PSYCHIATRY & NEUROLOGY

## 2019-05-20 PROCEDURE — 3008F PR BODY MASS INDEX (BMI) DOCUMENTED: ICD-10-PCS | Mod: CPTII,S$GLB,, | Performed by: PSYCHIATRY & NEUROLOGY

## 2019-05-20 PROCEDURE — 3075F PR MOST RECENT SYSTOLIC BLOOD PRESS GE 130-139MM HG: ICD-10-PCS | Mod: CPTII,S$GLB,, | Performed by: PSYCHIATRY & NEUROLOGY

## 2019-05-20 PROCEDURE — 99999 PR PBB SHADOW E&M-EST. PATIENT-LVL III: CPT | Mod: PBBFAC,,, | Performed by: PSYCHIATRY & NEUROLOGY

## 2019-05-20 PROCEDURE — 3075F SYST BP GE 130 - 139MM HG: CPT | Mod: CPTII,S$GLB,, | Performed by: PSYCHIATRY & NEUROLOGY

## 2019-05-20 PROCEDURE — 3079F PR MOST RECENT DIASTOLIC BLOOD PRESSURE 80-89 MM HG: ICD-10-PCS | Mod: CPTII,S$GLB,, | Performed by: PSYCHIATRY & NEUROLOGY

## 2019-05-20 PROCEDURE — 99215 PR OFFICE/OUTPT VISIT, EST, LEVL V, 40-54 MIN: ICD-10-PCS | Mod: S$GLB,,, | Performed by: PSYCHIATRY & NEUROLOGY

## 2019-05-20 PROCEDURE — 99999 PR PBB SHADOW E&M-EST. PATIENT-LVL III: ICD-10-PCS | Mod: PBBFAC,,, | Performed by: PSYCHIATRY & NEUROLOGY

## 2019-05-20 RX ORDER — CYPROHEPTADINE HYDROCHLORIDE 4 MG/1
4 TABLET ORAL
COMMUNITY
End: 2019-05-20 | Stop reason: SDUPTHER

## 2019-05-20 RX ORDER — CYPROHEPTADINE HYDROCHLORIDE 4 MG/1
4 TABLET ORAL NIGHTLY
Qty: 30 TABLET | Refills: 6 | Status: SHIPPED | OUTPATIENT
Start: 2019-05-20 | End: 2019-07-23 | Stop reason: SINTOL

## 2019-05-20 RX ORDER — CLONAZEPAM 0.5 MG/1
TABLET ORAL
Qty: 45 TABLET | Refills: 0 | Status: SHIPPED | OUTPATIENT
Start: 2019-05-20 | End: 2019-08-08 | Stop reason: SDUPTHER

## 2019-05-20 NOTE — PROGRESS NOTES
"Subjective:       Patient ID: Jomar Mercer is a 48 y.o. female.    Chief Complaint: Migraine    INTERVAL HISTORY 5/20/2019  The patient comes for follow up. She states that she is still having throbbing pain and intermittent numbness "between the hairline and the eyebrow, in the area of the throbbing. She notices that the pain is more pronounced after smoking. She is under stress as she is moving to [place], in [facility]. She states that is tolerating Periactin rather well at the 2 mg dose and is willing to increase it. Otherwise information below is still accurate and current.    INTERVAL HISTORY  The patient comes for follow up. She is still experiencing the throbbing sensation in the left side of the head, typically precipitated by smoking a cigarette and when exposed to bright lights. She is into "holistic" medications. Recently tried "DAYLIN." She prefers non pharmacological interventions. She is on Klonopin 0.125 mg QHS for sleep. She is here to discuss options.    HPI  The patient is a pleasant 47 y/o female presenting with chief complaint of left supraorbital, high frontal throbbing sensation without pain. She also feels a mild sensation of numbness. The symptoms are present about 30% of the time untreated and about 10% of the time with the application of essential oils. She has suffered with this since 2013, followed at [facility] and diagnosed with Migraine. She comes with a CD containing an MRI and MRA of the brain for review. I have also reviewed previous records from [place]. She has had Cardiac work up including a stress test and an 2D ECHO both within normal limits. She has history of hypertension and is on Propranolol 10 mg BID. She admits that stress is a trigger as well as fatigue. She is a  at [employer]. Her PMHx is significant for a thyroid nodule, subcentimeter, followed by Dr. Mora. She is a smoker, trying to quit planning on starting Chantix. She has tried " Gabapentin, Clonazepam, Cymbalta, and Elavil. Currently on low dose Clonazepam for anxiety  Please see details of headache characteristics below:  Headache questionnaire     1. When did your Headaches start?               2013        2. Where are your headaches located?              left        3. Your headache's characteristics:              Throbbing, Pounding     4. How long does the headache last?              (Patient did not answer)        5. How often does the headache occur?              daily        6. Are your headaches preceded or accompanied by other symptoms? yes              If yes, please describe.  Anxiety, numbness        7. Does the headache awaken you at night? no              If so, how often? N/A           8. Please jabari the word that best describes your headache's intensity:               (Patient did not answer)        9. Using a scale of 1 through 10, with 0 = no pain and 10 = the worst pain:              What score is your headache now? 0              What score is your headache at its worst? 6              What score is your headache at its best? 0           10. Possible associated headache symptoms:  [x]  Sensitivity to light                  [] Dizziness                    [x] Nasal or sinus pressure/ pain              [x] Sensitivity to noise                 [] Vertigo                        [] Problems with concentration  [x] Sensitivity to smells   [] Ringing in ears           [] Problems with memory                        [x] Blurred vision             [x] Irritability                     [] Problems with task completion   [] Double vision             [] Anger              []  Problems with relaxation  [] Loss of appetite                     [x] Anxiety                       [x] Neck tightness, Neck pain  [] Nausea                                   [x] Nasal congestion  [] Vomiting                                         11. Headache improving factors:  [x] Sleep              []  Heat  [] Darkness                    [] Ice  [] Local pressure           [] Menses (period)  [x] Massage                    [] Medications:          12. Headache worsening factors:   [x] Fatigue           [] Sneezing                    [] Changes in Weather  [] Light   [] Bending Over [] Stress  [] Noise  [] Ovulation                    [] Multiple Sclerosis Flare-Up  [] Smells            [] Menses                      [] Food   [] Coughing        [] Alcohol        13. Number of caffeinated drinks per day: 2        14. Number of diet drinks per day:  0          Review of Systems   Constitutional: Positive for fatigue. Negative for activity change, appetite change and fever.   HENT: Negative for congestion, dental problem, hearing loss, sinus pressure, tinnitus, trouble swallowing and voice change.    Eyes: Negative for photophobia, pain, redness and visual disturbance.   Respiratory: Negative for cough, chest tightness and shortness of breath.    Cardiovascular: Positive for palpitations. Negative for chest pain and leg swelling.   Gastrointestinal: Negative for abdominal pain, blood in stool, nausea and vomiting.   Endocrine: Negative for cold intolerance and heat intolerance.   Genitourinary: Negative for difficulty urinating, frequency, menstrual problem and urgency.   Musculoskeletal: Negative for arthralgias, back pain, gait problem, joint swelling, myalgias, neck pain and neck stiffness.   Skin: Negative.    Neurological: Positive for numbness. Negative for dizziness, tremors, seizures, syncope, facial asymmetry, speech difficulty, weakness, light-headedness and headaches.   Hematological: Negative for adenopathy. Does not bruise/bleed easily.   Psychiatric/Behavioral: Negative for agitation, behavioral problems, confusion, decreased concentration, self-injury, sleep disturbance and suicidal ideas. The patient is not nervous/anxious and is not hyperactive.         Past Medical History:   Diagnosis Date    a  "Family H/O CAD     "SEs q5yr At Age 50"    a Palpitations     Dr. Roge Johnson  Evaluation Was Negative    b Hypertension     16 RXd Low Salt Diet    d Family H/O DM     e Multinodular Goiter     Dr. Evan Mora Monitors With Yearly U/S    g Chronic Iron Deficiency     17 RXd An OTC "MVI With Iron" 1 Tab Daily; Likely From Menstrual Loss    g H/O Iron Deficiency Anemia     Her CBCs Are Normal On Daily PO Iron; Likely From Menstrual Loss    i 1/2 PPD X 20 YRs TUD     16 RXd OTC 2 Mg Nicotine Gum PRN    j Gallstone     16 Referred To Dr. Selma Crane, But She Is Deferring Sx For Now; 4/20/15 ABD U/S = 1 Mobile Gallstone W/O Inflammation    j GERD     Dr. Yovanny Robison; 6/8/15 EGD (Dr. MERNA Chun) = Normal Except For A Small Hiatal Hernia    j Mildly Elevated ALT Levels     Dr. Yovanny Robison; 16 RXd OTC Vitamin E 400 IU Daily; 16 HepABC And Ferritin = Normal/Not Elevated    j Small Hiatal Hernia     Dr. Yovanny Robison; 6/8/15 EGD (Dr. MERNA Chun) = Normal Except For A Small Hiatal Hernia    m Migraines     16 Referred To Dr. Colin Carreon For 2nd Opinion; Dr. Marino ([facility] [place]); On Clonazepam And Elavil For This    n Anxiety     On Clonazepam And Elavil For This    o Allergic Rhinosinusitis     q Vitamin D Deficiency     16 Increased Her OTC D3 To 5K IU Daily    Wellness Visit 2017      Past Surgical History:   Procedure Laterality Date     SECTION       Family History   Problem Relation Age of Onset    Stroke Mother     Hypertension Mother     Stroke Father     Heart disease Father     Hypertension Father     Kidney disease Father     Cancer Paternal Aunt      breast    Stroke Maternal Grandmother     Heart disease Paternal Grandfather      Social History     Social History    Marital status:      Spouse name: N/A    Number of children: N/A    Years of education: N/A     Occupational History    Not on file. "     Social History Main Topics    Smoking status: Former Smoker     Packs/day: 0.25     Years: 25.00     Quit date: 1/24/2018    Smokeless tobacco: Never Used    Alcohol use No    Drug use: No    Sexual activity: Not on file     Other Topics Concern    Not on file     Social History Narrative    No narrative on file     Review of patient's allergies indicates:   Allergen Reactions    No known drug allergies          Current Outpatient Medications   Medication Sig    clonazePAM (KLONOPIN) 0.5 MG tablet TAKE 1/2 TABLET EVERY EVENING    cyproheptadine (PERIACTIN) 4 mg tablet Take 4 mg by mouth.    fluticasone (FLONASE) 50 mcg/actuation nasal spray SPRAY 2 SPRAYS IN EACH NOSTRIL ONCE DAILY    levocetirizine (XYZAL) 5 MG tablet TAKE 1 TABLET (5 MG TOTAL) BY MOUTH AS NEEDED FOR ALLERGIES.    propranolol (INDERAL) 10 MG tablet Take 1 tablet (10 mg total) by mouth 2 (two) times daily.    cholecalciferol, vitamin D3, 5,000 unit Tab Take 5,000 Units by mouth once daily.    iron-[id number] sodium 11-3--50 mg-mg-mcg-mg-mg Tab Take 1 tablet by mouth once daily.     No current facility-administered medications for this visit.        Objective:      Vitals:    05/20/19 1528   BP: 138/81   Pulse: 77   Resp: 18     Body mass index is 28.19 kg/m².    Physical Exam    Constitutional:   She appears well-developed and well-nourished. She is well groomed    HENT:    Head: Atraumatic, oral and nasal mucosa intact  Eyes: Conjunctivae and EOM are normal. Pupils are equal, round, and reactive to light OU  Neck: Neck supple. No thyromegaly present  Cardiovascular: Normal rate and normal heart sounds  No murmur heard  Pulmonary/Chest: Effort normal and breath sounds normal  Musculoskeletal: Normal range of motion. No joint stiffness. No vertebral point tenderness  Skin: Skin is warm and dry  Psychiatric: Normal mood and affect     Neuro exam:    Mental status:  Awake, attentive, Alert, oriented to self, place, year  and month  Language function is intact    Cranial Nerves:  Smell was not formally evaluated  Cranial Nerves II - XII: intact  Pursuits were smooth, normal saccades, no nystagmus OU  Motor - facial movement was symmetrical and normal     Palate moved well and was symmetrical with normal palatal and oral sensation  Tongue movements were full    Coordination:     Rapid alternating movements and rapid finger tapping - normal bilaterally  Finger to nose - normal and symmetric bilaterally   Heel to shin test - normal and symmetric bilaterally   Arm roll - smooth and symmetric   No intentional or positional tremor.     Motor:  Normal muscle bulk and symmetry. No fasciculations were noted    No pronator drift  Strength 5/5 bilaterally     Reflexes:  Tendon reflexes were 3 + at biceps, triceps, brachioradialis, patellar, and Achilles bilaterally  On plantar stimulation toes were down going bilaterally  No clonus was noted     Sensory:Decreased 20% to pp on the left hemibody including the face.     Gait: Romberg absent. Normal gait. Normal arm swing and turns. Good tandem    Review of Data:  Lab Results   Component Value Date     12/26/2017     06/18/2015    K 3.8 12/26/2017    K 4.2 06/18/2015    MG 2.1 06/18/2015     12/26/2017     06/18/2015    CO2 27 12/26/2017    BUN 14 12/26/2017    CREATININE 0.70 12/26/2017    CREATININE 0.72 06/18/2015    GLU 90 12/26/2017    HGBA1C 5.2 12/26/2017    AST 19 12/26/2017    AST 20 03/21/2016    ALT 32 12/26/2017    ALBUMIN 4.1 12/26/2017    ALBUMIN 4.3 06/18/2015    PROT 7.0 12/26/2017    BILITOT 0.6 12/26/2017    CHOL 196 12/26/2017    HDL 68 12/26/2017    LDLCALC 112.6 12/26/2017    TRIG 77 12/26/2017       Lab Results   Component Value Date    WBC 4.93 12/26/2017    HGB 11.8 (L) 12/26/2017    HCT 37.6 12/26/2017    MCV 86 12/26/2017     12/26/2017       Lab Results   Component Value Date    TSH 1.420 12/26/2017    TSH 1.420 12/26/2017     I reviewed  images of MRA and MRI of the brain and shared the images with the patient. There are multiple foci of white matter lesions likely representing gliosis.  Hypercoag work up, also entirely negative      Assessment and Plan   The patient has a headache NOS but with multiple migraine features like the throbbing nature of the symptoms and the associated photophobia, phonophobia, osmophobia (highly specific for migraine), improvement with sleep.  Continue Klonopin, last RX for 45 tabs of 0.5 mg has lasted until now and she is still has some  Periactin 4 mg po QHS, now that she is tolerating 2 mg   She has a negative cardiac work up. Hypercoagulable work up entirely negative  Sub centimeter thyroid nodule, followed by Dr. Mora  Hypertension, on propranolol 10 mg BID  RTC in 6 months  Counseling:  More than 50% of the 40 minute encounter was spent face to face counseling the patient regarding current status and future plan of care as well as side of the medications. All questions were answered to patient's satisfaction          Loree Bhandari M.D  Medical Director, Headache and Facial Pain  [facility]

## 2019-06-28 ENCOUNTER — TELEPHONE (OUTPATIENT)
Dept: NEUROLOGY | Facility: CLINIC | Age: 49
End: 2019-06-28

## 2019-06-28 NOTE — TELEPHONE ENCOUNTER
Called patient in regards to rescheduling 7/29 appointment due to provider being out of clinic. No answer , left voicemail to return call.

## 2019-07-16 ENCOUNTER — TELEPHONE (OUTPATIENT)
Dept: NEUROLOGY | Facility: CLINIC | Age: 49
End: 2019-07-16

## 2019-07-23 ENCOUNTER — PATIENT MESSAGE (OUTPATIENT)
Dept: NEUROLOGY | Facility: CLINIC | Age: 49
End: 2019-07-23

## 2019-07-23 RX ORDER — ACETAZOLAMIDE 250 MG/1
250 TABLET ORAL NIGHTLY
Qty: 30 TABLET | Refills: 11 | Status: SHIPPED | OUTPATIENT
Start: 2019-07-23 | End: 2023-05-15

## 2019-07-25 ENCOUNTER — TELEPHONE (OUTPATIENT)
Dept: NEUROLOGY | Facility: CLINIC | Age: 49
End: 2019-07-25

## 2019-07-25 NOTE — TELEPHONE ENCOUNTER
Called patient and rescheduled follow up appointment due to provider being out of clinic. Appointment successfully rescheduled . Patient verbalized understating.

## 2019-08-08 ENCOUNTER — OFFICE VISIT (OUTPATIENT)
Dept: NEUROLOGY | Facility: CLINIC | Age: 49
End: 2019-08-08
Payer: COMMERCIAL

## 2019-08-08 VITALS
SYSTOLIC BLOOD PRESSURE: 127 MMHG | DIASTOLIC BLOOD PRESSURE: 66 MMHG | WEIGHT: 179.69 LBS | HEIGHT: 67 IN | BODY MASS INDEX: 28.2 KG/M2 | HEART RATE: 79 BPM | RESPIRATION RATE: 16 BRPM

## 2019-08-08 DIAGNOSIS — G43.019 INTRACTABLE MIGRAINE WITHOUT AURA AND WITHOUT STATUS MIGRAINOSUS: Primary | ICD-10-CM

## 2019-08-08 DIAGNOSIS — I10 ESSENTIAL HYPERTENSION: ICD-10-CM

## 2019-08-08 DIAGNOSIS — R93.0 ABNORMAL MRI OF HEAD: ICD-10-CM

## 2019-08-08 DIAGNOSIS — E04.2 MULTINODULAR GOITER: ICD-10-CM

## 2019-08-08 DIAGNOSIS — E55.9 VITAMIN D DEFICIENCY: ICD-10-CM

## 2019-08-08 DIAGNOSIS — E61.1 IRON DEFICIENCY: ICD-10-CM

## 2019-08-08 DIAGNOSIS — K44.9 HIATAL HERNIA: ICD-10-CM

## 2019-08-08 DIAGNOSIS — F41.9 ANXIETY: ICD-10-CM

## 2019-08-08 PROCEDURE — 3008F PR BODY MASS INDEX (BMI) DOCUMENTED: ICD-10-PCS | Mod: CPTII,S$GLB,, | Performed by: PSYCHIATRY & NEUROLOGY

## 2019-08-08 PROCEDURE — 3008F BODY MASS INDEX DOCD: CPT | Mod: CPTII,S$GLB,, | Performed by: PSYCHIATRY & NEUROLOGY

## 2019-08-08 PROCEDURE — 3078F DIAST BP <80 MM HG: CPT | Mod: CPTII,S$GLB,, | Performed by: PSYCHIATRY & NEUROLOGY

## 2019-08-08 PROCEDURE — 3074F SYST BP LT 130 MM HG: CPT | Mod: CPTII,S$GLB,, | Performed by: PSYCHIATRY & NEUROLOGY

## 2019-08-08 PROCEDURE — 3074F PR MOST RECENT SYSTOLIC BLOOD PRESSURE < 130 MM HG: ICD-10-PCS | Mod: CPTII,S$GLB,, | Performed by: PSYCHIATRY & NEUROLOGY

## 2019-08-08 PROCEDURE — 99999 PR PBB SHADOW E&M-EST. PATIENT-LVL III: ICD-10-PCS | Mod: PBBFAC,,, | Performed by: PSYCHIATRY & NEUROLOGY

## 2019-08-08 PROCEDURE — 3078F PR MOST RECENT DIASTOLIC BLOOD PRESSURE < 80 MM HG: ICD-10-PCS | Mod: CPTII,S$GLB,, | Performed by: PSYCHIATRY & NEUROLOGY

## 2019-08-08 PROCEDURE — 99214 OFFICE O/P EST MOD 30 MIN: CPT | Mod: S$GLB,,, | Performed by: PSYCHIATRY & NEUROLOGY

## 2019-08-08 PROCEDURE — 99999 PR PBB SHADOW E&M-EST. PATIENT-LVL III: CPT | Mod: PBBFAC,,, | Performed by: PSYCHIATRY & NEUROLOGY

## 2019-08-08 PROCEDURE — 99214 PR OFFICE/OUTPT VISIT, EST, LEVL IV, 30-39 MIN: ICD-10-PCS | Mod: S$GLB,,, | Performed by: PSYCHIATRY & NEUROLOGY

## 2019-08-08 RX ORDER — CLONAZEPAM 0.5 MG/1
TABLET ORAL
Qty: 45 TABLET | Refills: 0 | Status: SHIPPED | OUTPATIENT
Start: 2019-08-08 | End: 2019-10-22 | Stop reason: SDUPTHER

## 2019-08-08 NOTE — PROGRESS NOTES
"Subjective:       Patient ID: Jomar Mercer is a 48 y.o. female.    Chief Complaint: Migraine    INTERVAL HISTORY 8/8/2019  The patient comes for follow up. She states that Periactin 2 mg po QHS was associated with nocturnal pruritus. She stopped taking and sent me a message. I then recommended low dose Diamox, this was effective controlling the throbbing component of her pain. She stopped taking when the throbbing went away. She is moving to [facility] at the end of the month. She is anxious about the move. She is having sleep difficulties. Otherwise information below is still accurate and current.      INTERVAL HISTORY 5/20/2019  The patient comes for follow up. She states that she is still having throbbing pain and intermittent numbness "between the hairline and the eyebrow, in the area of the throbbing. She notices that the pain is more pronounced after smoking. She is under stress as she is moving to [place], in [facility]. She states that is tolerating Periactin rather well at the 2 mg dose and is willing to increase it. Otherwise information below is still accurate and current.    INTERVAL HISTORY  The patient comes for follow up. She is still experiencing the throbbing sensation in the left side of the head, typically precipitated by smoking a cigarette and when exposed to bright lights. She is into "holistic" medications. Recently tried "DAYLIN." She prefers non pharmacological interventions. She is on Klonopin 0.125 mg QHS for sleep. She is here to discuss options.    HPI  The patient is a pleasant 49 y/o female presenting with chief complaint of left supraorbital, high frontal throbbing sensation without pain. She also feels a mild sensation of numbness. The symptoms are present about 30% of the time untreated and about 10% of the time with the application of essential oils. She has suffered with this since 2013, followed at [facility] and diagnosed with Migraine. She comes with a CD containing an " MRI and MRA of the brain for review. I have also reviewed previous records from [place]. She has had Cardiac work up including a stress test and an 2D ECHO both within normal limits. She has history of hypertension and is on Propranolol 10 mg BID. She admits that stress is a trigger as well as fatigue. She is a  at [employer]. Her PMHx is significant for a thyroid nodule, subcentimeter, followed by Dr. Mora. She is a smoker, trying to quit planning on starting Chantix. She has tried Gabapentin, Clonazepam, Cymbalta, and Elavil. Currently on low dose Clonazepam for anxiety  Please see details of headache characteristics below:  Headache questionnaire     1. When did your Headaches start?               2013        2. Where are your headaches located?              left        3. Your headache's characteristics:              Throbbing, Pounding     4. How long does the headache last?              (Patient did not answer)        5. How often does the headache occur?              daily        6. Are your headaches preceded or accompanied by other symptoms? yes              If yes, please describe.  Anxiety, numbness        7. Does the headache awaken you at night? no              If so, how often? N/A           8. Please jabari the word that best describes your headache's intensity:               (Patient did not answer)        9. Using a scale of 1 through 10, with 0 = no pain and 10 = the worst pain:              What score is your headache now? 0              What score is your headache at its worst? 6              What score is your headache at its best? 0           10. Possible associated headache symptoms:  [x]  Sensitivity to light                  [] Dizziness                    [x] Nasal or sinus pressure/ pain              [x] Sensitivity to noise                 [] Vertigo                        [] Problems with concentration  [x] Sensitivity to smells   [] Ringing in ears            [] Problems with memory                        [x] Blurred vision             [x] Irritability                     [] Problems with task completion   [] Double vision             [] Anger              []  Problems with relaxation  [] Loss of appetite                     [x] Anxiety                       [x] Neck tightness, Neck pain  [] Nausea                                   [x] Nasal congestion  [] Vomiting                                         11. Headache improving factors:  [x] Sleep              [] Heat  [] Darkness                    [] Ice  [] Local pressure           [] Menses (period)  [x] Massage                    [] Medications:          12. Headache worsening factors:   [x] Fatigue           [] Sneezing                    [] Changes in Weather  [] Light   [] Bending Over [] Stress  [] Noise  [] Ovulation                    [] Multiple Sclerosis Flare-Up  [] Smells            [] Menses                      [] Food   [] Coughing        [] Alcohol        13. Number of caffeinated drinks per day: 2        14. Number of diet drinks per day:  0          Review of Systems   Constitutional: Positive for fatigue. Negative for activity change, appetite change and fever.   HENT: Negative for congestion, dental problem, hearing loss, sinus pressure, tinnitus, trouble swallowing and voice change.    Eyes: Negative for photophobia, pain, redness and visual disturbance.   Respiratory: Negative for cough, chest tightness and shortness of breath.    Cardiovascular: Positive for palpitations. Negative for chest pain and leg swelling.   Gastrointestinal: Negative for abdominal pain, blood in stool, nausea and vomiting.   Endocrine: Negative for cold intolerance and heat intolerance.   Genitourinary: Negative for difficulty urinating, frequency, menstrual problem and urgency.   Musculoskeletal: Negative for arthralgias, back pain, gait problem, joint swelling, myalgias, neck pain and neck stiffness.   Skin: Negative.   "  Neurological: Positive for numbness. Negative for dizziness, tremors, seizures, syncope, facial asymmetry, speech difficulty, weakness, light-headedness and headaches.   Hematological: Negative for adenopathy. Does not bruise/bleed easily.   Psychiatric/Behavioral: Negative for agitation, behavioral problems, confusion, decreased concentration, self-injury, sleep disturbance and suicidal ideas. The patient is not nervous/anxious and is not hyperactive.         Past Medical History:   Diagnosis Date    a Family H/O CAD     "SEs q5yr At Age 50"    a Palpitations     Dr. Roge Johnson 2015 Evaluation Was Negative    b Hypertension     5/9/16 RXd Low Salt Diet    d Family H/O DM     e Multinodular Goiter     Dr. Evan Mora Monitors With Yearly U/S    g Chronic Iron Deficiency     12/27/17 RXd An OTC "MVI With Iron" 1 Tab Daily; Likely From Menstrual Loss    g H/O Iron Deficiency Anemia     Her CBCs Are Normal On Daily PO Iron; Likely From Menstrual Loss    i 1/2 PPD X 20 YRs TUD     5/9/16 RXd OTC 2 Mg Nicotine Gum PRN    j Gallstone     7/26/16 Referred To Dr. Selma Crane, But She Is Deferring Sx For Now; 4/20/15 ABD U/S = 1 Mobile Gallstone W/O Inflammation    j GERD     Dr. Yovanny Robison; 6/8/15 EGD (Dr. MERNA Chun) = Normal Except For A Small Hiatal Hernia    j Mildly Elevated ALT Levels     Dr. Yovanny Robison; 12/23/16 RXd OTC Vitamin E 400 IU Daily; 5/27/16 HepABC And Ferritin = Normal/Not Elevated    j Small Hiatal Hernia     Dr. Yovanny Robison; 6/8/15 EGD (Dr. MERNA Chun) = Normal Except For A Small Hiatal Hernia    m Migraines     12/23/16 Referred To Dr. Colin Carreon For 2nd Opinion; Dr. Marino ([facility] [place]); On Clonazepam And Elavil For This    n Anxiety     On Clonazepam And Elavil For This    o Allergic Rhinosinusitis     q Vitamin D Deficiency     12/23/16 Increased Her OTC D3 To 5K IU Daily    Wellness Visit 12/21/2017      Past Surgical History:   Procedure Laterality Date    "  SECTION       Family History   Problem Relation Age of Onset    Stroke Mother     Hypertension Mother     Stroke Father     Heart disease Father     Hypertension Father     Kidney disease Father     Cancer Paternal Aunt      breast    Stroke Maternal Grandmother     Heart disease Paternal Grandfather      Social History     Social History    Marital status:      Spouse name: N/A    Number of children: N/A    Years of education: N/A     Occupational History    Not on file.     Social History Main Topics    Smoking status: Former Smoker     Packs/day: 0.25     Years: 25.00     Quit date: 2018    Smokeless tobacco: Never Used    Alcohol use No    Drug use: No    Sexual activity: Not on file     Other Topics Concern    Not on file     Social History Narrative    No narrative on file     Review of patient's allergies indicates:   Allergen Reactions    No known drug allergies          Current Outpatient Medications   Medication Sig    clonazePAM (KLONOPIN) 0.5 MG tablet TAKE 1/2 TABLET EVERY EVENING    cyproheptadine (PERIACTIN) 4 mg tablet Take 4 mg by mouth.    fluticasone (FLONASE) 50 mcg/actuation nasal spray SPRAY 2 SPRAYS IN EACH NOSTRIL ONCE DAILY    levocetirizine (XYZAL) 5 MG tablet TAKE 1 TABLET (5 MG TOTAL) BY MOUTH AS NEEDED FOR ALLERGIES.    propranolol (INDERAL) 10 MG tablet Take 1 tablet (10 mg total) by mouth 2 (two) times daily.    cholecalciferol, vitamin D3, 5,000 unit Tab Take 5,000 Units by mouth once daily.    iron-[id number] sodium 96-8--50 mg-mg-mcg-mg-mg Tab Take 1 tablet by mouth once daily.     No current facility-administered medications for this visit.        Objective:      Vitals:    19 1120   BP: 127/66   Pulse: 79   Resp: 16     Body mass index is 28.14 kg/m².    Physical Exam    Constitutional:   She appears well-developed and well-nourished. She is well groomed    HENT:    Head: Atraumatic, oral and nasal mucosa  intact  Eyes: Conjunctivae and EOM are normal. Pupils are equal, round, and reactive to light OU  Neck: Neck supple. No thyromegaly present  Cardiovascular: Normal rate and normal heart sounds  No murmur heard  Pulmonary/Chest: Effort normal and breath sounds normal  Musculoskeletal: Normal range of motion. No joint stiffness. No vertebral point tenderness  Skin: Skin is warm and dry  Psychiatric: Normal mood and affect     Neuro exam:    Mental status:  Awake, attentive, Alert, oriented to self, place, year and month  Language function is intact    Cranial Nerves:  Smell was not formally evaluated  Cranial Nerves II - XII: intact  Pursuits were smooth, normal saccades, no nystagmus OU  Motor - facial movement was symmetrical and normal     Palate moved well and was symmetrical with normal palatal and oral sensation  Tongue movements were full    Coordination:     Rapid alternating movements and rapid finger tapping - normal bilaterally  Finger to nose - normal and symmetric bilaterally   Heel to shin test - normal and symmetric bilaterally   Arm roll - smooth and symmetric   No intentional or positional tremor.     Motor:  Normal muscle bulk and symmetry. No fasciculations were noted    No pronator drift  Strength 5/5 bilaterally     Reflexes:  Tendon reflexes were 3 + at biceps, triceps, brachioradialis, patellar, and Achilles bilaterally  On plantar stimulation toes were down going bilaterally  No clonus was noted     Sensory:Decreased 20% to pp on the left hemibody including the face.     Gait: Romberg absent. Normal gait. Normal arm swing and turns. Good tandem    Review of Data:  Lab Results   Component Value Date     12/26/2017     06/18/2015    K 3.8 12/26/2017    K 4.2 06/18/2015    MG 2.1 06/18/2015     12/26/2017     06/18/2015    CO2 27 12/26/2017    BUN 14 12/26/2017    CREATININE 0.70 12/26/2017    CREATININE 0.72 06/18/2015    GLU 90 12/26/2017    HGBA1C 5.2 12/26/2017    AST  19 12/26/2017    AST 20 03/21/2016    ALT 32 12/26/2017    ALBUMIN 4.1 12/26/2017    ALBUMIN 4.3 06/18/2015    PROT 7.0 12/26/2017    BILITOT 0.6 12/26/2017    CHOL 196 12/26/2017    HDL 68 12/26/2017    LDLCALC 112.6 12/26/2017    TRIG 77 12/26/2017       Lab Results   Component Value Date    WBC 4.93 12/26/2017    HGB 11.8 (L) 12/26/2017    HCT 37.6 12/26/2017    MCV 86 12/26/2017     12/26/2017       Lab Results   Component Value Date    TSH 1.420 12/26/2017    TSH 1.420 12/26/2017     I reviewed images of MRA and MRI of the brain and shared the images with the patient. There are multiple foci of white matter lesions likely representing gliosis.  Hypercoag work up, also entirely negative      Assessment and Plan   The patient has a headache NOS but with multiple migraine features like the throbbing nature of the symptoms and the associated photophobia, phonophobia, osmophobia (highly specific for migraine), improvement with sleep.  Continue Klonopin, 0.5 mg prn for anxiety  Resume Diamox if throbbing recurs  She has a negative cardiac work up. Hypercoagulable work up entirely negative  Sub centimeter thyroid nodule, followed by Dr. Mora  Hypertension, on propranolol 10 mg BID  RTC prn  Counseling:  More than 50% of the 25 minute encounter was spent face to face counseling the patient regarding current status and future plan of care as well as side of the medications. All questions were answered to patient's satisfaction          Loree Bhandari M.D  Medical Director, Headache and Facial Pain  [facility]

## 2019-10-21 ENCOUNTER — TELEPHONE (OUTPATIENT)
Dept: NEUROLOGY | Facility: CLINIC | Age: 49
End: 2019-10-21

## 2019-10-22 DIAGNOSIS — G43.719 INTRACTABLE CHRONIC MIGRAINE WITHOUT AURA AND WITHOUT STATUS MIGRAINOSUS: Primary | ICD-10-CM

## 2019-10-22 RX ORDER — CLONAZEPAM 0.5 MG/1
TABLET ORAL
Qty: 45 TABLET | Refills: 0 | Status: SHIPPED | OUTPATIENT
Start: 2019-10-22 | End: 2022-09-12

## 2022-10-18 PROBLEM — Z72.0 TOBACCO ABUSE: Status: ACTIVE | Noted: 2020-01-16

## 2022-10-18 PROBLEM — E04.1 THYROID NODULE: Status: ACTIVE | Noted: 2019-12-02

## 2022-10-18 PROBLEM — F51.01 PRIMARY INSOMNIA: Status: ACTIVE | Noted: 2022-04-29

## 2022-10-18 PROBLEM — K82.4 POLYP OF GALLBLADDER: Status: ACTIVE | Noted: 2021-09-13

## 2022-10-18 PROBLEM — K90.9 INTESTINAL MALABSORPTION: Status: ACTIVE | Noted: 2022-01-11

## 2022-10-18 PROBLEM — E78.2 MIXED HYPERLIPIDEMIA: Status: ACTIVE | Noted: 2022-01-11

## 2023-01-20 ENCOUNTER — TELEPHONE (OUTPATIENT)
Dept: OTOLARYNGOLOGY | Facility: CLINIC | Age: 53
End: 2023-01-20
Payer: COMMERCIAL

## 2023-01-20 NOTE — TELEPHONE ENCOUNTER
----- Message from Miguel Chaidez sent at 1/20/2023  8:06 AM CST -----  Contact: Self  Type:  Same Day Appointment Request    Caller is requesting a same day appointment.  Caller declined first available appointment listed below.      Name of Caller:  Patient  When is the first available appointment?  1/26  Symptoms:  Recurrent sinus infections [J32.9]  Best Call Back Number:  [phone]   Additional Information:

## 2023-01-20 NOTE — TELEPHONE ENCOUNTER
S/w pt and she wanted appt for today. I informed pt that we do not have anything today and scheduled appt for 1-26-23 with Tunde

## 2023-02-01 ENCOUNTER — LAB VISIT (OUTPATIENT)
Dept: LAB | Facility: HOSPITAL | Age: 53
End: 2023-02-01
Attending: INTERNAL MEDICINE
Payer: COMMERCIAL

## 2023-02-01 DIAGNOSIS — R14.0 BLOATING: ICD-10-CM

## 2023-02-01 DIAGNOSIS — R10.84 ABDOMINAL PAIN, GENERALIZED: Primary | ICD-10-CM

## 2023-02-01 LAB
ALBUMIN SERPL BCP-MCNC: 4.2 G/DL (ref 3.5–5.2)
ALP SERPL-CCNC: 65 U/L (ref 55–135)
ALT SERPL W/O P-5'-P-CCNC: 26 U/L (ref 10–44)
ANION GAP SERPL CALC-SCNC: 10 MMOL/L (ref 8–16)
AST SERPL-CCNC: 18 U/L (ref 10–40)
BASOPHILS # BLD AUTO: 0.07 K/UL (ref 0–0.2)
BASOPHILS NFR BLD: 1.2 % (ref 0–1.9)
BILIRUB SERPL-MCNC: 0.5 MG/DL (ref 0.1–1)
BUN SERPL-MCNC: 18 MG/DL (ref 6–20)
CALCIUM SERPL-MCNC: 9.4 MG/DL (ref 8.7–10.5)
CHLORIDE SERPL-SCNC: 103 MMOL/L (ref 95–110)
CO2 SERPL-SCNC: 26 MMOL/L (ref 23–29)
CREAT SERPL-MCNC: 0.8 MG/DL (ref 0.5–1.4)
DIFFERENTIAL METHOD: NORMAL
EOSINOPHIL # BLD AUTO: 0.1 K/UL (ref 0–0.5)
EOSINOPHIL NFR BLD: 2.4 % (ref 0–8)
ERYTHROCYTE [DISTWIDTH] IN BLOOD BY AUTOMATED COUNT: 13 % (ref 11.5–14.5)
EST. GFR  (NO RACE VARIABLE): >60 ML/MIN/1.73 M^2
GLUCOSE SERPL-MCNC: 127 MG/DL (ref 70–110)
HCT VFR BLD AUTO: 39.7 % (ref 37–48.5)
HGB BLD-MCNC: 12.8 G/DL (ref 12–16)
IMM GRANULOCYTES # BLD AUTO: 0.01 K/UL (ref 0–0.04)
IMM GRANULOCYTES NFR BLD AUTO: 0.2 % (ref 0–0.5)
LYMPHOCYTES # BLD AUTO: 1.8 K/UL (ref 1–4.8)
LYMPHOCYTES NFR BLD: 31 % (ref 18–48)
MCH RBC QN AUTO: 28.4 PG (ref 27–31)
MCHC RBC AUTO-ENTMCNC: 32.2 G/DL (ref 32–36)
MCV RBC AUTO: 88 FL (ref 82–98)
MONOCYTES # BLD AUTO: 0.5 K/UL (ref 0.3–1)
MONOCYTES NFR BLD: 8.2 % (ref 4–15)
NEUTROPHILS # BLD AUTO: 3.4 K/UL (ref 1.8–7.7)
NEUTROPHILS NFR BLD: 57 % (ref 38–73)
NRBC BLD-RTO: 0 /100 WBC
PLATELET # BLD AUTO: 288 K/UL (ref 150–450)
PMV BLD AUTO: 10.5 FL (ref 9.2–12.9)
POTASSIUM SERPL-SCNC: 4 MMOL/L (ref 3.5–5.1)
PROT SERPL-MCNC: 7.2 G/DL (ref 6–8.4)
RBC # BLD AUTO: 4.51 M/UL (ref 4–5.4)
SODIUM SERPL-SCNC: 139 MMOL/L (ref 136–145)
WBC # BLD AUTO: 5.94 K/UL (ref 3.9–12.7)

## 2023-02-01 PROCEDURE — 80053 COMPREHEN METABOLIC PANEL: CPT | Performed by: INTERNAL MEDICINE

## 2023-02-01 PROCEDURE — 36415 COLL VENOUS BLD VENIPUNCTURE: CPT | Mod: PO | Performed by: INTERNAL MEDICINE

## 2023-02-01 PROCEDURE — 85025 COMPLETE CBC W/AUTO DIFF WBC: CPT | Performed by: INTERNAL MEDICINE

## 2023-02-08 ENCOUNTER — OFFICE VISIT (OUTPATIENT)
Dept: OTOLARYNGOLOGY | Facility: CLINIC | Age: 53
End: 2023-02-08
Payer: COMMERCIAL

## 2023-02-08 VITALS — WEIGHT: 174.63 LBS | HEIGHT: 67 IN | BODY MASS INDEX: 27.41 KG/M2

## 2023-02-08 DIAGNOSIS — J32.9 RECURRENT SINUS INFECTIONS: ICD-10-CM

## 2023-02-08 DIAGNOSIS — J01.90 SUBACUTE RHINOSINUSITIS: Primary | ICD-10-CM

## 2023-02-08 PROCEDURE — 99204 PR OFFICE/OUTPT VISIT, NEW, LEVL IV, 45-59 MIN: ICD-10-PCS | Mod: S$GLB,,, | Performed by: STUDENT IN AN ORGANIZED HEALTH CARE EDUCATION/TRAINING PROGRAM

## 2023-02-08 PROCEDURE — 99204 OFFICE O/P NEW MOD 45 MIN: CPT | Mod: S$GLB,,, | Performed by: STUDENT IN AN ORGANIZED HEALTH CARE EDUCATION/TRAINING PROGRAM

## 2023-02-08 PROCEDURE — 1160F RVW MEDS BY RX/DR IN RCRD: CPT | Mod: CPTII,S$GLB,, | Performed by: STUDENT IN AN ORGANIZED HEALTH CARE EDUCATION/TRAINING PROGRAM

## 2023-02-08 PROCEDURE — 1159F PR MEDICATION LIST DOCUMENTED IN MEDICAL RECORD: ICD-10-PCS | Mod: CPTII,S$GLB,, | Performed by: STUDENT IN AN ORGANIZED HEALTH CARE EDUCATION/TRAINING PROGRAM

## 2023-02-08 PROCEDURE — 1159F MED LIST DOCD IN RCRD: CPT | Mod: CPTII,S$GLB,, | Performed by: STUDENT IN AN ORGANIZED HEALTH CARE EDUCATION/TRAINING PROGRAM

## 2023-02-08 PROCEDURE — 3008F BODY MASS INDEX DOCD: CPT | Mod: CPTII,S$GLB,, | Performed by: STUDENT IN AN ORGANIZED HEALTH CARE EDUCATION/TRAINING PROGRAM

## 2023-02-08 PROCEDURE — 1160F PR REVIEW ALL MEDS BY PRESCRIBER/CLIN PHARMACIST DOCUMENTED: ICD-10-PCS | Mod: CPTII,S$GLB,, | Performed by: STUDENT IN AN ORGANIZED HEALTH CARE EDUCATION/TRAINING PROGRAM

## 2023-02-08 PROCEDURE — 99999 PR PBB SHADOW E&M-EST. PATIENT-LVL IV: CPT | Mod: PBBFAC,,, | Performed by: STUDENT IN AN ORGANIZED HEALTH CARE EDUCATION/TRAINING PROGRAM

## 2023-02-08 PROCEDURE — 3008F PR BODY MASS INDEX (BMI) DOCUMENTED: ICD-10-PCS | Mod: CPTII,S$GLB,, | Performed by: STUDENT IN AN ORGANIZED HEALTH CARE EDUCATION/TRAINING PROGRAM

## 2023-02-08 PROCEDURE — 99999 PR PBB SHADOW E&M-EST. PATIENT-LVL IV: ICD-10-PCS | Mod: PBBFAC,,, | Performed by: STUDENT IN AN ORGANIZED HEALTH CARE EDUCATION/TRAINING PROGRAM

## 2023-02-08 RX ORDER — KETOCONAZOLE 20 MG/ML
SHAMPOO, SUSPENSION TOPICAL
COMMUNITY
Start: 2022-10-25 | End: 2023-06-01

## 2023-02-08 NOTE — PATIENT INSTRUCTIONS
PLEASE PERFORM SINUS RINSES 2 TIMES DAILY UNTIL YOUR NEXT VISIT.  Then flonase and astelin twice a day  Daily allegra or similar.   Can add singulair back in 3 weeks or so if not controlling.          DIRECTIONS FOR SINUS SALINE RINSE To see demonstration: Enter [email or web address]/[email or web address]?[email or web address] into the browser address box, or go to You tube, and under the search box, enter sinus rinse. Click on [employer] Sinus Rinse Video    Step 1    Step 1 Please wash your hands. Fill the clean bottle with the designated volume of warm distilled water, filtered water or previously boiled water. You may warm the water in a microwave but we recommend that you warm it in increments of five seconds. This is to avoid excessive heating of the water and damage to the device or scalding your nasal passage.    Step 2    Step 2 Cut the SINUS RINSE mixture packet at the corner and pour its contents into the bottle. Tighten the cap & tube on the bottle securely, place one finger over the tip of the cap and shake the bottle gently to dissolve the mixture.      Step 3    Step 3 Standing in front of a sink, bend forward to your comfort level and tilt your head down. Keeping your mouth open without holding your breath, place cap snugly against your nasal passage and SQUEEZE BOTTLE GENTLY until the solution starts draining from the OPPOSITE nasal passage or from your mouth. Keep squeezing the bottle GENTLY until at least 1/4 to 1/2 (60 to 120 mL) of the bottle is used for a proper rinse. Do not swallow the solution.    Step 4    Blow your nose gently, without pinching your nose completely because this will apply pressure on the eardrums. If tolerable, sniff in any residual solution remaining in the nasal passage once or twice prior to blowing your nose as this may clean out the posterior nasopharyngeal area (the area at the back of your nasal passage). Some solution will reach the back of the throat, so please spit it out. To help  improve drainage of any residual solution, blow your nose gently while tilting your head to the opposite side of the nasal passage that you just rinsed.    Step 5    Now repeat steps 3 & 4 for your other nasal passage.    Step 6     Air dry the Sinus Rinse bottle, cap, and tube on a clean paper towel, a glass plate to store the bottle cap and tube. If there is any solution leftover, please discard it. We recommend you make a fresh solution each time you rinse. Rinse 5 times each day, OR as directed by your physician. Warnings: DO NOT RINSE IF NASAL PASSAGE IS COMPLETELY BLOCKED OR IF YOU HAVE AN EAR INFECTION OR BLOCKED EARS. If you have had ear surgery, please contact your physician prior to irrigation. If you experience any pressure in your ears, stop the rinse and get further directions from your physician or contact our office during regular business hours. To avoid any ear discomfort: Heat the solution to lukewarm, do not use hot, boiling or cold water. Keep your mouth open. Do not hold your breath and if possible make the sound ERUM....ERUM... Make sure to take the position as shown. Gently squeeze 1/4 of the bottle at a time (60mL / 2 ounces). Stop the rinse if you feel any solution sensation near the ears. You may rinse with a partially blocked nasal passage. Please do not use for any other purposes. Please rinse at least ONE HOUR PRIOR to bedtime, in order to avoid any residual solution dripping in the throat.    >> Before using the SINUS RINSE kit, please inspect the cap, tube and bottle carefully for wear and tear. If any of the components appear discolored or cracked, please contact [employer] to obtain a replacement. You must follow the cleaning instructions provided in this brochure or cleaning instruction card prior to each use.    >> The SINUS RINSE bottle and mixture are to be used only for nasalirrigation. Do not use for any other purposes.    >> We recommend that you use the rinse ONE HOUR PRIOR to  bedtime in order to avoid any residual solution dripping in the throat.    Tips to avoid ear discomfort while rinsing    If you have had ear surgery, please contact your physician prior to irrigation. Do not use if you have an ear infection or blocked ears. Rinse with lukewarm water. Keep your mouth open. Do not hold your breath while rinsing. While rinsing, make sure to tilt your. Gently squeeze the bottle while rinsing; do not squeeze the bottle very forcefully. Stop the rinse if you feel a sensation of fluid near your ears.    Tips to avoid unexpected drainage after rinsing    In rare situations, especially if you have had sinus surgery, the saline solution can pool in the sinus cavities and nasal passages and then drip from your nostrils hours after rinsing. To avoid this harmless but annoying inconvenience, take one extra step after rinsing: lean forward, tilt your head sideways and gently blow your nose. Then, tilt your head to the other side and blow again. You may need to repeat this several times. This will help rid your nasal passages of any excess mucus and remaining saline solution. If you find yourself experiencing delayed drainage often, do not rinse right before leaving your house or going to bed.

## 2023-02-08 NOTE — PROGRESS NOTES
"Otolaryngology Clinic Note    Subjective:       Patient ID: Jomar Mercer is a 53 y.o. female.    Chief Complaint: Sinus Problem (2 months/)      History of Present Illness: Jomar Mercer is a 53 y.o. female presenting with sinus issues. Worse since moving to [place], but mostly past 2 months have been severe.   Moved back to [place] this summer from [place]. 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 Saw NP x 2, got steroid shot, got better then came back. Second time got zpack. Has  campos retriever, smoker 1-2 cigarettes a day.    (EPOS guidelines for Chronic sinusitis)  Nasal obstruction/congestion: yes,  homeopathic medicine helps.   Nasal drainage: yes, clear  Decreased/absent smell: yes, fluctuates   Facial pain/pressure: yes and in ears    Number of times treated with antibiotics for sinus infection in 12 months:  1  Periods of resolution between infections: yes  Last round of antibiotics: 23: zpack    History of nasal/sinus surgery: no  Medications tried: yes- Xyzal- trying allegra, Mucinex, Flonase and astelin PRN, and Singulair- ran out  Suspected allergic triggers: yes, spring  Previous allergy testing: yes, years ago, mold, oak tree, not sure about others. No shots  History of headache disorder: yes, silent migraines    Social History     Tobacco Use   Smoking Status Every Day    Packs/day: 0.10    Years: 25.00    Pack years: 2.50    Types: Cigarettes    Last attempt to quit: 2018    Years since quittin.0   Smokeless Tobacco Never      History of bleeding disorder or current anti-coagulation: no  Medical issues include: NR       Past Surgical History:   Procedure Laterality Date     SECTION      CHOLECYSTECTOMY  10/2021     Past Medical History:   Diagnosis Date    a Family H/O CAD     "SEs q5yr At Age 50"    a Palpitations     Dr. Roge Johnson  Evaluation Was Negative    b Hypertension     16 RXd Low Salt " "Diet    b Hypotension 02/13/2019 2/13/19 D/Cd HCTZ 25 Mg qAM    d Family H/O DM     e Multinodular Goiter     Dr. Evan Mora Monitors With Yearly U/S    g Chronic Iron Deficiency     12/27/17 RXd An OTC "MVI With Iron" 1 Tab Daily; Likely From Menstrual Loss    g H/O Iron Deficiency Anemia     Her CBCs Are Normal On Daily PO Iron; Likely From Menstrual Loss    i 1/2 PPD X 20 YRs TUD     5/9/16 RXd OTC 2 Mg Nicotine Gum PRN    j Cholelithiasis     2/21/19 And 7/26/16 Referred To Dr. Selma Crane, But She Is Deferring Sx For Now; 4/20/15 ABD U/S = 1 Mobile Gallstone W/O Inflammation    j GERD     Dr. Yovanny Robison; 6/8/15 EGD (Dr. MERNA Chun) = Normal Except For A Small Hiatal Hernia    j Mildly Elevated ALT Levels     Dr. Yovanny Robison; 12/23/16 RXd OTC Vitamin E 400 IU Daily; 5/27/16 HepABC And Ferritin = Normal/Not Elevated    j Small Hiatal Hernia     Dr. Yovanny Robison; 6/8/15 EGD (Dr. MERNA Chun) = Normal Except For A Small Hiatal Hernia    m Migraines     12/23/16 Referred To Dr. Colin Carreon For 2nd Opinion; Dr. Marino (Neurology In [place]); On Clonazepam And Elavil For This    Mixed hyperlipidemia 1/11/2022    n Anxiety     On Clonazepam And Elavil For This    o Allergic Rhinosinusitis     q Vitamin D Deficiency     12/23/16 Increased Her OTC D3 To 5K IU Daily    Wellness Visit 12/26/2018      Social Determinants of Health     Tobacco Use: High Risk    Smoking Tobacco Use: Every Day    Smokeless Tobacco Use: Never    Passive Exposure: Not on file   Alcohol Use: Not on file   Financial Resource Strain: Not on file   Food Insecurity: Not on file   Transportation Needs: Not on file   Physical Activity: Not on file   Stress: Not on file   Social Connections: Not on file   Housing Stability: Not on file   Depression: Low Risk     Last PHQ Score: 0     Review of patient's allergies indicates:   Allergen Reactions    Cefdinir      Other reaction(s): GI Upset  Other reaction(s): GI Upset, Stomach ache   "     Current Outpatient Medications   Medication Instructions    acetaZOLAMIDE (DIAMOX) 250 mg, Oral, Nightly    amLODIPine (NORVASC) 2.5 mg, Oral, Daily    azelastine (ASTELIN) 137 mcg (0.1 %) nasal spray azelastine 137 mcg (0.1 %) nasal spray aerosol    buPROPion (WELLBUTRIN SR) 100 mg, Oral, 2 times daily, 1 tab po daily x 2 wks, then 1 tab po bid    cholecalciferol (vitamin D3) 5,000 Units, Oral, Daily    clobetasoL (TEMOVATE) 0.05 % external solution clobetasol 0.05 % scalp solution    clobetasoL (TEMOVATE) 0.05 % external solution Topical (Top)    clonazePAM (KLONOPIN) 0.25 mg, Oral, Nightly PRN, TAKE 1/2 TABLET EVERY EVENING    fluticasone (FLONASE) 50 mcg/actuation nasal spray SPRAY 2 SPRAYS IN EACH NOSTRIL ONCE DAILY    guaiFENesin (MUCINEX) 600 mg 12 hr tablet Oral    iron-FA-S82-[id number]-stxczds sodium 84-5--50 mg-mg-mcg-mg-mg Tab 1 tablet, Oral, Daily    ketoconazole (NIZORAL) 2 % shampoo Topical (Top), Three times weekly    levocetirizine (XYZAL) 5 MG tablet TAKE 1 TABLET (5 MG TOTAL) BY MOUTH AS NEEDED FOR ALLERGIES.    montelukast (SINGULAIR) 10 mg tablet montelukast 10 mg tablet   TAKE 1 TABLET BY MOUTH NIGHTLY    omeprazole (PRILOSEC) 20 MG capsule omeprazole 20 mg capsule,delayed release   TAKE 1 CAPSULE BY MOUTH TWICE DAILY FOR 14 DAYS    propranoloL (INDERAL) 20 mg, Oral, 2 times daily    temazepam (RESTORIL) 15 mg Cap temazepam 15 mg capsule         ENT ROS negative except as stated above.               Objective:      There were no vitals filed for this visit.    General: NAD, well appearing  Eyes: Normal conjunctiva and lids  Face: symmetric, nerve intact  Nose: The nose is without any evidence of any deformity. The nasal mucosa is inflamed and swollen. The septum is midline. There is no evidence of septal hematoma. The turbinates are mildly enlarged. Can see to middle meatus area with no angie drainage  Ears: The ears are with normal-appearing pinna. Examination of the canals is normal  appearing bilaterally. There is no drainage or erythema noted. The tympanic membranes are normal appearing with pearly color, normal-appearing landmarks and normal light reflex. Hearing is grossly intact.  Mouth: No obvious abnormalities to the lips. The teeth are unremarkable. The gingivae are without any obvious evidence of infection or lesion. The oral mucosa is moist and pink. There are no obvious masses to the hard or soft palate.   Oropharynx: The uvula is midline.  The tongue is midline. The posterior pharynx has some cobblestoning and clear PND. The tonsils are 2+  Salivary glands: The salivary glands are symmetric and not enlarged, no masses  Neck: No lymphadenopathy, trachea midline, thryoid not enlarged.  Psych: Normal mood and affect.   Neuro: Grossly intact  Speech: fluent         Assessment and Plan:       1. Subacute rhinosinusitis    2. Recurrent sinus infections          Sinus rinses twice a day, then flonase and astelin twice a day  Continue daily allegra  Can add singulair if needed, can add before follow up if needed    Can scope, CT, do allergy testing if not doing better.     RTC: 6 weeks    Plan of care was discussed in detail with the patient, who agreed with the plan as above. All questions were answered in detail.     Lili Griffiths MD  Otolaryngology

## 2023-02-15 ENCOUNTER — HOSPITAL ENCOUNTER (OUTPATIENT)
Dept: RADIOLOGY | Facility: HOSPITAL | Age: 53
Discharge: HOME | End: 2023-02-15
Attending: INTERNAL MEDICINE
Payer: COMMERCIAL

## 2023-02-15 DIAGNOSIS — R10.84 ABDOMINAL PAIN, GENERALIZED: ICD-10-CM

## 2023-02-15 DIAGNOSIS — R14.0 ABDOMINAL BLOATING: ICD-10-CM

## 2023-02-15 PROCEDURE — 76700 US EXAM ABDOM COMPLETE: CPT | Mod: TC,PO

## 2023-02-15 PROCEDURE — 76700 US ABDOMEN COMPLETE: ICD-10-PCS | Mod: 26,,, | Performed by: RADIOLOGY

## 2023-02-15 PROCEDURE — 76700 US EXAM ABDOM COMPLETE: CPT | Mod: 26,,, | Performed by: RADIOLOGY

## 2023-03-14 ENCOUNTER — PATIENT MESSAGE (OUTPATIENT)
Dept: OTOLARYNGOLOGY | Facility: CLINIC | Age: 53
End: 2023-03-14
Payer: COMMERCIAL

## 2023-03-14 RX ORDER — MONTELUKAST SODIUM 10 MG/1
10 TABLET ORAL NIGHTLY
Qty: 30 TABLET | Refills: 11 | Status: SHIPPED | OUTPATIENT
Start: 2023-03-14 | End: 2024-01-17

## 2023-03-14 RX ORDER — FLUTICASONE PROPIONATE 50 MCG
1 SPRAY, SUSPENSION (ML) NASAL 2 TIMES DAILY
Qty: 16 G | Refills: 11 | Status: SHIPPED | OUTPATIENT
Start: 2023-03-14

## 2023-03-20 ENCOUNTER — TELEPHONE (OUTPATIENT)
Dept: OTOLARYNGOLOGY | Facility: CLINIC | Age: 53
End: 2023-03-20
Payer: COMMERCIAL

## 2023-03-20 NOTE — TELEPHONE ENCOUNTER
S/w pt, told pt  will be out on maternity leave until June. The pt stated, she'll be fine and continue the medication  gave her.

## 2023-05-15 ENCOUNTER — TELEPHONE (OUTPATIENT)
Dept: NEUROLOGY | Facility: CLINIC | Age: 53
End: 2023-05-15
Payer: COMMERCIAL

## 2023-05-15 NOTE — TELEPHONE ENCOUNTER
Addendum to previous message. Advised patient to get appoinment with PCP for HBP.  She reported will be seeing NP this afternoon.  Scheduled first available in [facility].

## 2023-05-15 NOTE — TELEPHONE ENCOUNTER
----- Message from Rina Dukes sent at 5/15/2023 10:08 AM CDT -----  Contact: Self  Type:  Same Day Appointment Request    Caller is requesting a same day appointment.  Caller declined first available appointment listed below.      Name of Caller:  Patient  When is the first available appointment?  09/14  Symptoms:  Headaches, and having muscle twitches (new symptom) and HBP  Best Call Back Number:  [phone]  Additional Information:  Please call pt back to advise. Thank You

## 2023-05-15 NOTE — TELEPHONE ENCOUNTER
----- Message from Rina Dukes sent at 5/15/2023 10:08 AM CDT -----  Contact: Self  Type:  Same Day Appointment Request    Caller is requesting a same day appointment.  Caller declined first available appointment listed below.      Name of Caller:  Patient  When is the first available appointment?  09/14  Symptoms:  Headaches, and having muscle twitches (new symptom) and HBP  Best Call Back Number:  [phone]  Additional Information:  Please call pt back to advise. Thank You

## 2023-06-01 ENCOUNTER — OFFICE VISIT (OUTPATIENT)
Dept: ENDOCRINOLOGY | Facility: CLINIC | Age: 53
End: 2023-06-01
Payer: COMMERCIAL

## 2023-06-01 VITALS
OXYGEN SATURATION: 97 % | HEIGHT: 67 IN | HEART RATE: 68 BPM | SYSTOLIC BLOOD PRESSURE: 118 MMHG | WEIGHT: 178.69 LBS | DIASTOLIC BLOOD PRESSURE: 70 MMHG | BODY MASS INDEX: 28.04 KG/M2

## 2023-06-01 DIAGNOSIS — E04.2 MULTINODULAR GOITER: Primary | ICD-10-CM

## 2023-06-01 PROCEDURE — 1159F PR MEDICATION LIST DOCUMENTED IN MEDICAL RECORD: ICD-10-PCS | Mod: CPTII,S$GLB,, | Performed by: INTERNAL MEDICINE

## 2023-06-01 PROCEDURE — 99999 PR PBB SHADOW E&M-EST. PATIENT-LVL IV: ICD-10-PCS | Mod: PBBFAC,,, | Performed by: INTERNAL MEDICINE

## 2023-06-01 PROCEDURE — 3078F PR MOST RECENT DIASTOLIC BLOOD PRESSURE < 80 MM HG: ICD-10-PCS | Mod: CPTII,S$GLB,, | Performed by: INTERNAL MEDICINE

## 2023-06-01 PROCEDURE — 1160F PR REVIEW ALL MEDS BY PRESCRIBER/CLIN PHARMACIST DOCUMENTED: ICD-10-PCS | Mod: CPTII,S$GLB,, | Performed by: INTERNAL MEDICINE

## 2023-06-01 PROCEDURE — 1160F RVW MEDS BY RX/DR IN RCRD: CPT | Mod: CPTII,S$GLB,, | Performed by: INTERNAL MEDICINE

## 2023-06-01 PROCEDURE — 3078F DIAST BP <80 MM HG: CPT | Mod: CPTII,S$GLB,, | Performed by: INTERNAL MEDICINE

## 2023-06-01 PROCEDURE — 3074F SYST BP LT 130 MM HG: CPT | Mod: CPTII,S$GLB,, | Performed by: INTERNAL MEDICINE

## 2023-06-01 PROCEDURE — 99999 PR PBB SHADOW E&M-EST. PATIENT-LVL IV: CPT | Mod: PBBFAC,,, | Performed by: INTERNAL MEDICINE

## 2023-06-01 PROCEDURE — 1159F MED LIST DOCD IN RCRD: CPT | Mod: CPTII,S$GLB,, | Performed by: INTERNAL MEDICINE

## 2023-06-01 PROCEDURE — 99203 PR OFFICE/OUTPT VISIT, NEW, LEVL III, 30-44 MIN: ICD-10-PCS | Mod: S$GLB,,, | Performed by: INTERNAL MEDICINE

## 2023-06-01 PROCEDURE — 3008F PR BODY MASS INDEX (BMI) DOCUMENTED: ICD-10-PCS | Mod: CPTII,S$GLB,, | Performed by: INTERNAL MEDICINE

## 2023-06-01 PROCEDURE — 99203 OFFICE O/P NEW LOW 30 MIN: CPT | Mod: S$GLB,,, | Performed by: INTERNAL MEDICINE

## 2023-06-01 PROCEDURE — 3074F PR MOST RECENT SYSTOLIC BLOOD PRESSURE < 130 MM HG: ICD-10-PCS | Mod: CPTII,S$GLB,, | Performed by: INTERNAL MEDICINE

## 2023-06-01 PROCEDURE — 3008F BODY MASS INDEX DOCD: CPT | Mod: CPTII,S$GLB,, | Performed by: INTERNAL MEDICINE

## 2023-06-01 RX ORDER — COLESEVELAM 180 1/1
625 TABLET ORAL DAILY PRN
COMMUNITY
Start: 2023-04-18 | End: 2023-09-29

## 2023-06-01 NOTE — PROGRESS NOTES
CHIEF COMPLAINT: Multinodular Goiter  53 y.o..  Being seen as a new patient visit.  Last seen by me in February of 2018.  Last ultrasound 2017. Moved to [facility] and recently moved back.  No XRT to head or neck. No difficulty swallowing. No change in voice.             PAST MEDICAL HISTORY/PAST SURGICAL HISTORY:  Reviewed in EPIC     SOCIAL HISTORY: Smokes 1/2 ppd. No alcohol. 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 No DM.     MEDICATIONS/ALLERGIES: The patient's MedCard has been updated and reviewed.         PE:    GENERAL: Well developed, well nourished.  NECK: Supple, trachea midline, no palpable nodules  CHEST: Resp even and unlabored, CTA bilateral.  CARDIAC: RRR, S1, S2 heard, no murmurs, rubs, S3, or S4          Latest Reference Range & Units 10/28/22 09:22   TSH 0.450 - 4.500 uIU/mL 0.949        ASSESSMENT/PLAN:  1. Multinodular Goiter- TSH WNL.  Has not had an ultrasound in several years.  No obstructive symptoms.  Check thyroid ultrasound to assess for any change.      FOLLOWUP   needs thyroid ultrasound

## 2023-07-10 ENCOUNTER — TELEPHONE (OUTPATIENT)
Dept: INTERNAL MEDICINE | Facility: CLINIC | Age: 53
End: 2023-07-10
Payer: COMMERCIAL

## 2023-07-10 RX ORDER — AZELASTINE 1 MG/ML
1 SPRAY, METERED NASAL 2 TIMES DAILY
Qty: 30 ML | Refills: 0 | Status: SHIPPED | OUTPATIENT
Start: 2023-07-10 | End: 2023-08-15

## 2023-07-10 RX ORDER — AZELASTINE 1 MG/ML
SPRAY, METERED NASAL
Qty: 30 ML | Refills: 0 | Status: SHIPPED | OUTPATIENT
Start: 2023-07-10 | End: 2023-07-10

## 2023-07-10 NOTE — TELEPHONE ENCOUNTER
----- Message from Colette Baker MA sent at 7/10/2023 11:05 AM CDT -----    ----- Message -----  From: Doreen Augustin  Sent: 7/10/2023  10:59 AM CDT  To: Jocelin Ma Staff    Person Calling:ESTELLA DAVID [[id number]]    Contact Preference (MyChart or Callback): Phone     Phone Number:[phone]    Lab:     Pharmacy:[facility] [place] [address] [phone]    1. Name of Medication?azelastine (ASTELIN) 137 mcg (0.1 %) nasal spray  2. What is the dosage:  3. How often do you take this medication?  4. 30 day or 90 day supply?

## 2023-08-15 RX ORDER — AZELASTINE 1 MG/ML
1 SPRAY, METERED NASAL 2 TIMES DAILY
Qty: 30 ML | Refills: 1 | Status: SHIPPED | OUTPATIENT
Start: 2023-08-15 | End: 2023-08-31

## 2023-08-31 RX ORDER — AZELASTINE 1 MG/ML
1 SPRAY, METERED NASAL 2 TIMES DAILY
Qty: 30 ML | Refills: 1 | Status: SHIPPED | OUTPATIENT
Start: 2023-08-31 | End: 2024-01-17

## 2023-10-20 ENCOUNTER — OFFICE VISIT (OUTPATIENT)
Dept: OPTOMETRY | Facility: CLINIC | Age: 53
End: 2023-10-20
Payer: COMMERCIAL

## 2023-10-20 DIAGNOSIS — H10.32 ACUTE CONJUNCTIVITIS OF LEFT EYE, UNSPECIFIED ACUTE CONJUNCTIVITIS TYPE: Primary | ICD-10-CM

## 2023-10-20 DIAGNOSIS — H11.153 PINGUECULA OF BOTH EYES: ICD-10-CM

## 2023-10-20 PROCEDURE — 3044F PR MOST RECENT HEMOGLOBIN A1C LEVEL <7.0%: ICD-10-PCS | Mod: CPTII,S$GLB,, | Performed by: OPTOMETRIST

## 2023-10-20 PROCEDURE — 99203 PR OFFICE/OUTPT VISIT, NEW, LEVL III, 30-44 MIN: ICD-10-PCS | Mod: S$GLB,,, | Performed by: OPTOMETRIST

## 2023-10-20 PROCEDURE — 99999 PR PBB SHADOW E&M-EST. PATIENT-LVL III: ICD-10-PCS | Mod: PBBFAC,,, | Performed by: OPTOMETRIST

## 2023-10-20 PROCEDURE — 1160F PR REVIEW ALL MEDS BY PRESCRIBER/CLIN PHARMACIST DOCUMENTED: ICD-10-PCS | Mod: CPTII,S$GLB,, | Performed by: OPTOMETRIST

## 2023-10-20 PROCEDURE — 3044F HG A1C LEVEL LT 7.0%: CPT | Mod: CPTII,S$GLB,, | Performed by: OPTOMETRIST

## 2023-10-20 PROCEDURE — 99203 OFFICE O/P NEW LOW 30 MIN: CPT | Mod: S$GLB,,, | Performed by: OPTOMETRIST

## 2023-10-20 PROCEDURE — 1160F RVW MEDS BY RX/DR IN RCRD: CPT | Mod: CPTII,S$GLB,, | Performed by: OPTOMETRIST

## 2023-10-20 PROCEDURE — 99999 PR PBB SHADOW E&M-EST. PATIENT-LVL III: CPT | Mod: PBBFAC,,, | Performed by: OPTOMETRIST

## 2023-10-20 PROCEDURE — 1159F MED LIST DOCD IN RCRD: CPT | Mod: CPTII,S$GLB,, | Performed by: OPTOMETRIST

## 2023-10-20 PROCEDURE — 1159F PR MEDICATION LIST DOCUMENTED IN MEDICAL RECORD: ICD-10-PCS | Mod: CPTII,S$GLB,, | Performed by: OPTOMETRIST

## 2023-10-20 RX ORDER — NEOMYCIN SULFATE, POLYMYXIN B SULFATE AND DEXAMETHASONE 3.5; 10000; 1 MG/ML; [USP'U]/ML; MG/ML
1 SUSPENSION/ DROPS OPHTHALMIC 4 TIMES DAILY
Qty: 5 ML | Refills: 0 | Status: SHIPPED | OUTPATIENT
Start: 2023-10-20 | End: 2023-10-27

## 2023-10-20 NOTE — PROGRESS NOTES
HPI    Pt here for red and irritated OS    Pt states about a week ago OS started with redness and a bump by her   cornea.   Pt has a FES, using clear eyes with no relief.    Last edited by Lisa Thomason on 10/20/2023  9:18 AM.            Assessment /Plan     For exam results, see Encounter Report.    Acute conjunctivitis of left eye, unspecified acute conjunctivitis type  -     neomycin-polymyxin-dexamethasone (MAXITROL) 3.5mg/mL-10,000 unit/mL-0.1 % DrpS; Place 1 drop into both eyes 4 (four) times daily. for 7 days  Dispense: 5 mL; Refill: 0    Pinguecula of both eyes      1,2. Start maxitrol drops 4x/day x 1 week, if better can start art tears long term. RTC or call if no better

## 2024-01-04 ENCOUNTER — PATIENT MESSAGE (OUTPATIENT)
Dept: OPTOMETRY | Facility: CLINIC | Age: 54
End: 2024-01-04
Payer: COMMERCIAL

## 2024-01-04 PROBLEM — F17.200 TOBACCO USE DISORDER: Status: ACTIVE | Noted: 2024-01-04

## 2024-01-04 PROBLEM — Z90.49 HISTORY OF LAPAROSCOPIC CHOLECYSTECTOMY: Status: ACTIVE | Noted: 2024-01-04

## 2024-01-04 PROBLEM — H65.93 BILATERAL SEROUS OTITIS MEDIA: Status: ACTIVE | Noted: 2024-01-04

## 2024-01-04 PROBLEM — Z00.01 ENCOUNTER FOR GENERAL ADULT MEDICAL EXAMINATION WITH ABNORMAL FINDINGS: Status: RESOLVED | Noted: 2024-01-04 | Resolved: 2024-01-04

## 2024-01-04 PROBLEM — E78.00 HYPERCHOLESTEROLEMIA: Status: ACTIVE | Noted: 2024-01-04

## 2024-01-04 PROBLEM — Z00.01 ENCOUNTER FOR GENERAL ADULT MEDICAL EXAMINATION WITH ABNORMAL FINDINGS: Status: ACTIVE | Noted: 2024-01-04

## 2024-01-04 PROBLEM — Z72.0 TOBACCO ABUSE: Status: RESOLVED | Noted: 2020-01-16 | Resolved: 2024-01-04

## 2024-01-04 PROBLEM — Z00.00 PREVENTATIVE HEALTH CARE: Status: RESOLVED | Noted: 2024-01-04 | Resolved: 2024-01-04

## 2024-01-04 PROBLEM — Z00.00 PREVENTATIVE HEALTH CARE: Status: ACTIVE | Noted: 2024-01-04

## 2024-01-11 ENCOUNTER — TELEPHONE (OUTPATIENT)
Dept: NEUROLOGY | Facility: CLINIC | Age: 54
End: 2024-01-11
Payer: COMMERCIAL

## 2024-01-11 NOTE — TELEPHONE ENCOUNTER
----- Message from Aniceto Bright sent at 1/11/2024 12:08 PM CST -----  Type: Need Medical Advice   Who Called: Patient  Best callback number: [phone]  Additional Information: Patient returned missed call from Serina, patient stated the appointment for 3/26 @9am will work her her she will be here  Please call to further assist, Thanks.

## 2024-01-17 ENCOUNTER — OFFICE VISIT (OUTPATIENT)
Dept: OTOLARYNGOLOGY | Facility: CLINIC | Age: 54
End: 2024-01-17
Payer: COMMERCIAL

## 2024-01-17 VITALS — HEIGHT: 67 IN | BODY MASS INDEX: 29.83 KG/M2 | WEIGHT: 190.06 LBS

## 2024-01-17 DIAGNOSIS — H93.8X3 EAR FULLNESS, BILATERAL: Primary | ICD-10-CM

## 2024-01-17 DIAGNOSIS — Z45.89 TYMPANOSTOMY TUBE CHECK: ICD-10-CM

## 2024-01-17 PROBLEM — E78.2 MIXED HYPERLIPIDEMIA: Status: RESOLVED | Noted: 2022-01-11 | Resolved: 2024-01-17

## 2024-01-17 PROCEDURE — 3008F BODY MASS INDEX DOCD: CPT | Mod: CPTII,S$GLB,, | Performed by: OTOLARYNGOLOGY

## 2024-01-17 PROCEDURE — 99999 PR PBB SHADOW E&M-EST. PATIENT-LVL III: CPT | Mod: PBBFAC,,, | Performed by: OTOLARYNGOLOGY

## 2024-01-17 PROCEDURE — 1160F RVW MEDS BY RX/DR IN RCRD: CPT | Mod: CPTII,S$GLB,, | Performed by: OTOLARYNGOLOGY

## 2024-01-17 PROCEDURE — 99214 OFFICE O/P EST MOD 30 MIN: CPT | Mod: S$GLB,,, | Performed by: OTOLARYNGOLOGY

## 2024-01-17 PROCEDURE — 3044F HG A1C LEVEL LT 7.0%: CPT | Mod: CPTII,S$GLB,, | Performed by: OTOLARYNGOLOGY

## 2024-01-17 PROCEDURE — 1159F MED LIST DOCD IN RCRD: CPT | Mod: CPTII,S$GLB,, | Performed by: OTOLARYNGOLOGY

## 2024-01-17 NOTE — PROGRESS NOTES
"Subjective:       Patient ID: Jomar Mercer is a 54 y.o. female.    Chief Complaint: Hearing Loss and Fluid in ears    Jomar is here for ETD.   Length of symptoms: began when she was returning on a flight coming back in November. Symptoms primarily in the right ear.  She had issues with ability insufflating her ears. She has occasional tinnitus.   She has fluctuations throughout the day - She describes "pressure"   Does improve with laying down. She denies autophony or audible respirations. She endorses pulsatile tinnitus when she is laying down. No head trauma.   She has tried multiple allergy / sinonasal meds which have not helped.   She was seen by a local ENT and had effusion(s) which tube was discussed and placed. She noted no improvement when the tube was placed and it has not improved over time.     She was Prescribed Dyazide but only took for a week or so. Was told to hold off until she saw me. She was having some eye twitching and maybe mild side effects from medication.   She does have increased sensitivity (pressure) in her ear with louder sound.   No vertigo.     Patient validated questionnaires (if applicable):      %            No data to display                   No data to display                   No data to display                     Social History     Tobacco Use   Smoking Status Every Day    Current packs/day: 0.00    Average packs/day: 0.1 packs/day for 25.0 years (2.5 ttl pk-yrs)    Types: Cigarettes    Start date: 1993    Last attempt to quit: 2018    Years since quittin.0   Smokeless Tobacco Never     Social History     Substance and Sexual Activity   Alcohol Use Yes    Comment: socially          Objective:        Constitutional:   She is oriented to person, place, and time. She appears well-developed and well-nourished. She appears alert. She is active. Normal speech.      Head:  Normocephalic and atraumatic. Head is without TMJ tenderness. No scars. Salivary glands normal.  " Facial strength is normal.      Ears:    Right Ear: No drainage or swelling. No middle ear effusion. A PE tube is seen.   Left Ear: No drainage or swelling.  No middle ear effusion.     Nose:  Mucosal edema (rhinitis changes) and septal deviation (moderate L) present. No rhinorrhea or sinus tenderness. Turbinate hypertrophy.      Mouth/Throat  Oropharynx clear and moist without lesions or asymmetry, normal uvula midline and mirror exam normal. Normal dentition. No uvula swelling, lacerations or trismus. No oropharyngeal exudate. Tonsillar erythema, tonsillar exudate.      Neck:  Full range of motion with neck supple and no adenopathy. Thyroid tenderness is present. No tracheal deviation, no edema, no erythema, normal range of motion, no stridor, no crepitus and no neck rigidity present. No thyroid mass present.     Cardiovascular:    Intact distal pulses and normal pulses.              Pulmonary/Chest:   Effort normal and breath sounds normal. No stridor.     Psychiatric:   Her speech is normal and behavior is normal. Her mood appears not anxious. Her affect is not labile.     Neurological:   She is alert and oriented to person, place, and time. No sensory deficit.     Skin:   No abrasions, lacerations, lesions, or rashes. No abrasion and no bruising noted.         Tests / Results:  I reviewed the audiograms from the outside:    7/2023:  Bilateral low freq mild SNHL rising to fairly normal      12/2023 (FOLLOWING TUBE:)  Normalization of left low freq changes      Assessment:       1. Ear fullness, bilateral    2. Tympanostomy tube check          Plan:         Symptoms of ETD have not improved with tube. Has some other vague fullness / sensitivity issues not entirely explained by ETD  CT Temporal Bone to eval superior semicircular canal.  She has some low frequency changes on audio which may be mild hydrops - can consider lower dose Dyazide though if CT abnormal, will have evaluation by Neurotology

## 2024-01-22 ENCOUNTER — PATIENT MESSAGE (OUTPATIENT)
Dept: OTOLARYNGOLOGY | Facility: CLINIC | Age: 54
End: 2024-01-22
Payer: COMMERCIAL

## 2024-01-22 ENCOUNTER — HOSPITAL ENCOUNTER (OUTPATIENT)
Dept: RADIOLOGY | Facility: HOSPITAL | Age: 54
Discharge: HOME | End: 2024-01-22
Attending: OTOLARYNGOLOGY
Payer: COMMERCIAL

## 2024-01-22 DIAGNOSIS — H93.8X3 EAR FULLNESS, BILATERAL: ICD-10-CM

## 2024-01-22 PROCEDURE — 70480 CT ORBIT/EAR/FOSSA W/O DYE: CPT | Mod: 26,,, | Performed by: RADIOLOGY

## 2024-01-22 PROCEDURE — 70480 CT ORBIT/EAR/FOSSA W/O DYE: CPT | Mod: TC,PO

## 2024-01-23 ENCOUNTER — PATIENT MESSAGE (OUTPATIENT)
Dept: OTOLARYNGOLOGY | Facility: CLINIC | Age: 54
End: 2024-01-23
Payer: COMMERCIAL

## 2024-01-23 ENCOUNTER — TELEPHONE (OUTPATIENT)
Dept: OTOLARYNGOLOGY | Facility: CLINIC | Age: 54
End: 2024-01-23
Payer: COMMERCIAL

## 2024-01-23 DIAGNOSIS — Z96.22 HISTORY OF TYMPANOSTOMY TUBE PLACEMENT: ICD-10-CM

## 2024-01-23 DIAGNOSIS — H83.8X1 SUPERIOR SEMICIRCULAR CANAL DEHISCENCE OF RIGHT EAR: Primary | ICD-10-CM

## 2024-01-23 DIAGNOSIS — H93.8X1 EAR FULLNESS, RIGHT: ICD-10-CM

## 2024-01-23 NOTE — TELEPHONE ENCOUNTER
----- Message from Tyler Texieira MD sent at 1/23/2024  7:31 AM CST -----  Jomar,    The CT shows thinning of the bone in a certain part of the inner ear. This can likely accountf or  the fullness sensation you have in the ear that did not improve with the tube. It is called superior semicircular canal dehiscence. This is not something reliably treated with medication, so for now I would hold off on medication. If you wanted to discuss further with an ear specialist, I can make that referral to help distinguish whether this bone issue is likely causing your symptoms.    Tyler Teixeira MD  Otolaryngology - Head and Neck Surgery  Office: [phone]  Cell: [phone]  Fax: [phone]    This message was generated using voice dictation. Please excuse any errors that may have been created by the transcription software.

## 2024-01-23 NOTE — TELEPHONE ENCOUNTER
Pt verbalized understanding of results. Would like to proceed with referral to ear specialist. She had also mentions a sinus cyst that she was concerned about and was asking your recommendation for treatment?

## 2024-02-12 ENCOUNTER — OFFICE VISIT (OUTPATIENT)
Dept: OPTOMETRY | Facility: CLINIC | Age: 54
End: 2024-02-12
Payer: COMMERCIAL

## 2024-02-12 DIAGNOSIS — H11.153 PINGUECULA OF BOTH EYES: Primary | ICD-10-CM

## 2024-02-12 DIAGNOSIS — H04.123 BILATERAL DRY EYES: ICD-10-CM

## 2024-02-12 DIAGNOSIS — H52.7 REFRACTIVE ERROR: ICD-10-CM

## 2024-02-12 PROCEDURE — 92015 DETERMINE REFRACTIVE STATE: CPT | Mod: S$GLB,,, | Performed by: OPTOMETRIST

## 2024-02-12 PROCEDURE — 99999 PR PBB SHADOW E&M-EST. PATIENT-LVL III: CPT | Mod: PBBFAC,,, | Performed by: OPTOMETRIST

## 2024-02-12 PROCEDURE — 92014 COMPRE OPH EXAM EST PT 1/>: CPT | Mod: S$PBB,,, | Performed by: OPTOMETRIST

## 2024-02-12 NOTE — PROGRESS NOTES
HPI    Pt here for annual eye exam. Last exam- 2 yrs    Pt sts DVA stable. Pt using rx that are 4 yrs old, ruined most recent   pair. C.o redness OU x month. Pt using Refresh 1-2 times daily.  Pt denies   flashes/floaters/pain.   Last edited by Aisha Amor on 2/12/2024  9:04 AM.            Assessment /Plan     For exam results, see Encounter Report.    Pinguecula of both eyes    Bilateral dry eyes    Refractive error      1,2, Causing redness, Recommend artificial tears. 1 drop 2x per day. Chronicity of disease and treatment discussed.  3. New Spectacle Rx given, discussed different options for glasses. RTC 1 year routine eye exam.

## 2024-02-19 PROBLEM — N95.1 PERIMENOPAUSAL: Status: ACTIVE | Noted: 2024-02-19

## 2024-03-01 ENCOUNTER — OFFICE VISIT (OUTPATIENT)
Dept: OTOLARYNGOLOGY | Facility: CLINIC | Age: 54
End: 2024-03-01
Payer: COMMERCIAL

## 2024-03-01 DIAGNOSIS — Z96.22 HISTORY OF TYMPANOSTOMY TUBE PLACEMENT: ICD-10-CM

## 2024-03-01 DIAGNOSIS — H83.8X1 SUPERIOR SEMICIRCULAR CANAL DEHISCENCE OF RIGHT EAR: ICD-10-CM

## 2024-03-01 DIAGNOSIS — H93.8X1 EAR FULLNESS, RIGHT: ICD-10-CM

## 2024-03-01 PROCEDURE — 3044F HG A1C LEVEL LT 7.0%: CPT | Mod: CPTII,95,, | Performed by: OTOLARYNGOLOGY

## 2024-03-01 PROCEDURE — 99214 OFFICE O/P EST MOD 30 MIN: CPT | Mod: 95,,, | Performed by: OTOLARYNGOLOGY

## 2024-03-02 NOTE — PROGRESS NOTES
The patient location is: [place], [place]  The chief complaint leading to consultation is:     Visit type: audiovisual    Face to Face time with patient:  20 minutes of total time spent on the encounter, which includes face to face time and non-face to face time preparing to see the patient (eg, review of tests), Obtaining and/or reviewing separately obtained history, Documenting clinical information in the electronic or other health record, Independently interpreting results (not separately reported) and communicating results to the patient/family/caregiver, or Care coordination (not separately reported).         Each patient to whom he or she provides medical services by telemedicine is:  (1) informed of the relationship between the physician and patient and the respective role of any other health care provider with respect to management of the patient; and (2) notified that he or she may decline to receive medical services by telemedicine and may withdraw from such care at any time.    Notes:      Subjective     Patient ID: Jomar Mercer is a 54 y.o. female.    Chief Complaint:      HPI    Presents on referral for evaluation of SCDS via virtual visit.  She notes pressure sensation of right ear that started after a plane flight in November.  She reports pulsatile tinnitus.  She denies pressure induced vertigo or autophony.  She had a tube placed which did not help her symptoms    Review of Systems   Constitutional:  Negative for chills and fever.   HENT:  Negative for sore throat and trouble swallowing.    Respiratory:  Negative for apnea and chest tightness.    Cardiovascular:  Negative for chest pain.          Objective     Physical Exam  Constitutional:       Appearance: Normal appearance.   Neurological:      Mental Status: She is alert.       Data Reviewed:  Per Dr. 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 Ear fullness, right  -     Ambulatory referral/consult to ENT    3. History of tympanostomy tube placement  -     Ambulatory referral/consult to ENT        Recommend VEMP testing, repeat audio testing for supra bone conduction  F/u in clinic to re-discuss after completing the above.          No follow-ups on file.

## 2024-03-06 ENCOUNTER — PATIENT MESSAGE (OUTPATIENT)
Dept: OTOLARYNGOLOGY | Facility: CLINIC | Age: 54
End: 2024-03-06
Payer: COMMERCIAL

## 2024-03-06 ENCOUNTER — TELEPHONE (OUTPATIENT)
Dept: AUDIOLOGY | Facility: CLINIC | Age: 54
End: 2024-03-06
Payer: COMMERCIAL

## 2024-03-18 ENCOUNTER — TELEPHONE (OUTPATIENT)
Dept: OTOLARYNGOLOGY | Facility: CLINIC | Age: 54
End: 2024-03-18
Payer: COMMERCIAL

## 2024-03-18 NOTE — TELEPHONE ENCOUNTER
----- Message from Alexis Damian sent at 3/18/2024 12:52 PM CDT -----  Type: Needs Medical Advice    Who Called:  Pt    Best Call Back Number: [phone]    Additional Information: Pt is request cd for her ct scan that was done 1/22/24. Please call back to advise, Thanks!

## 2024-03-22 ENCOUNTER — CLINICAL SUPPORT (OUTPATIENT)
Dept: AUDIOLOGY | Facility: CLINIC | Age: 54
End: 2024-03-22
Payer: COMMERCIAL

## 2024-03-22 ENCOUNTER — PATIENT MESSAGE (OUTPATIENT)
Dept: AUDIOLOGY | Facility: CLINIC | Age: 54
End: 2024-03-22

## 2024-03-22 ENCOUNTER — OFFICE VISIT (OUTPATIENT)
Dept: OTOLARYNGOLOGY | Facility: CLINIC | Age: 54
End: 2024-03-22
Payer: COMMERCIAL

## 2024-03-22 DIAGNOSIS — G43.809 VESTIBULAR MIGRAINE: Primary | ICD-10-CM

## 2024-03-22 DIAGNOSIS — H93.8X1 EAR FULLNESS, RIGHT: ICD-10-CM

## 2024-03-22 DIAGNOSIS — H83.8X1 SUPERIOR SEMICIRCULAR CANAL DEHISCENCE OF RIGHT EAR: ICD-10-CM

## 2024-03-22 DIAGNOSIS — H81.8X9 OTOLITH DISEASE, UNSPECIFIED LATERALITY: Primary | ICD-10-CM

## 2024-03-22 DIAGNOSIS — H69.91 DYSFUNCTION OF RIGHT EUSTACHIAN TUBE: Primary | ICD-10-CM

## 2024-03-22 PROCEDURE — 3044F HG A1C LEVEL LT 7.0%: CPT | Mod: CPTII,S$GLB,, | Performed by: OTOLARYNGOLOGY

## 2024-03-22 PROCEDURE — 99999 PR PBB SHADOW E&M-EST. PATIENT-LVL I: CPT | Mod: PBBFAC,,, | Performed by: AUDIOLOGIST

## 2024-03-22 PROCEDURE — 92519 VEMP TST I&R CERVICAL&OCULAR: CPT | Mod: S$GLB,,, | Performed by: AUDIOLOGIST

## 2024-03-22 PROCEDURE — 1159F MED LIST DOCD IN RCRD: CPT | Mod: CPTII,S$GLB,, | Performed by: OTOLARYNGOLOGY

## 2024-03-22 PROCEDURE — 92567 TYMPANOMETRY: CPT | Mod: S$GLB,,, | Performed by: AUDIOLOGIST

## 2024-03-22 PROCEDURE — 99999 PR PBB SHADOW E&M-EST. PATIENT-LVL III: CPT | Mod: PBBFAC,,, | Performed by: OTOLARYNGOLOGY

## 2024-03-22 PROCEDURE — 99214 OFFICE O/P EST MOD 30 MIN: CPT | Mod: S$GLB,,, | Performed by: OTOLARYNGOLOGY

## 2024-03-22 PROCEDURE — 92557 COMPREHENSIVE HEARING TEST: CPT | Mod: S$GLB,,, | Performed by: AUDIOLOGIST

## 2024-03-22 NOTE — PROGRESS NOTES
Subjective     Patient ID: Jomar Mercer is a 54 y.o. female.    Chief Complaint: Results    HPI     Jomar Mercer is a 54 y.o. female here to follow-up after VEMP testing and repeat audiogram for evaluation of possible superior semicircular canal dehiscence syndrome.  Her main complaints are right ear fullness and bouts of dizziness and imbalance.  She reports a history of migraine.  She denies any pressure induced vertigo or autophony she had a tube placed several months ago and feels that this was no benefit.    Review of Systems   Constitutional: Negative.    HENT:  Positive for hearing loss and postnasal drip.    Eyes:  Positive for itching.   Respiratory: Negative.     Cardiovascular: Negative.    Endocrine: Negative.    Genitourinary: Negative.    Integumentary:  Negative.   Neurological:  Positive for dizziness and headaches.   Hematological: Negative.    Psychiatric/Behavioral:  Positive for sleep disturbance.           Objective     Physical Exam  Vitals and nursing note reviewed.   Constitutional:       Appearance: Normal appearance.   HENT:      Head: Normocephalic and atraumatic.      Right Ear: Tympanic membrane, ear canal and external ear normal. There is no impacted cerumen. A PE tube (PE tube in place with wax surrounding it) is present.      Left Ear: Tympanic membrane, ear canal and external ear normal. There is no impacted cerumen.   Neurological:      Mental Status: She is alert.     Data reviewed:       Audiogram shows right-sided conductive hearing loss with no super normal bone thresholds flat tympanogram due to tube    CT temporal bones image independently reviewed by me and show:   Right dehiscence of SSC, Left thinning of SSC       VEMP:    VEMP Evaluation     Ms. Jomar Mercer was seen for a VEMP evaluation today.        oVEMP testing was completed. Stimuli were presented at 97 dBnHL for each ear. No repeatable responses were obtained at 97 dBnHL at 500 Hz or 4000 Hz for either  ear.  cVEMP testing was completed. Stimuli were presented at 95 dBnHL and 75 dBnHL for each ear. No repeatable responses were obtained at 95 dBnHL or 55 dBnHL at 500 Hz for either ea        Assessment and Plan     1. Vestibular migraine  -     Ambulatory referral/consult to Physical/Occupational Therapy; Future; Expected date: 03/29/2024    2. 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 Ear fullness, right        Not convinced patients symptoms are due to SCDS at this time based on lack of autophony, VEMP findings and no supranormal bone thresholds.     Vestibular migraine may also cause ear fullness and is much more common a problem then SCD  Recommend neurology evaluation may consider trial of migraine prophylaxis.  Discussed option of starting nortriptyline but pt will discuss with her neurologist  PE tube removed today and will allow drum to heal as this may also be a cause of pt's fullness  Vestibular rehab eval for dizziness  F/u in 3 mo          No follow-ups on file.

## 2024-03-22 NOTE — PROGRESS NOTES
VEMP Evaluation    Ms. Jomar Mercer was seen for a VEMP evaluation today.      oVEMP testing was completed. Stimuli were presented at 97 dBnHL for each ear. No repeatable responses were obtained at 97 dBnHL at 500 Hz or 4000 Hz for either ear.  cVEMP testing was completed. Stimuli were presented at 95 dBnHL and 75 dBnHL for each ear. No repeatable responses were obtained at 95 dBnHL or 55 dBnHL at 500 Hz for either ear.    IMPRESSION:   Absent oVEMPs. Absent oVEMPs are suggestive of an abnormality of the utricle and/or superior vestibular nerve.  Absent cVEMPs.  Absent cVEMPs are suggestive of an abnormality of the saccule and/or inferior vestibular nerve.     Today's results should be interpreted with caution due to a conductive hearing loss of the right ear in the presence of a PE tube (abnormality of the middle ear system).    RECOMMENDATION:   Follow-up with referring physician.

## 2024-03-22 NOTE — PROGRESS NOTES
Ms. Jomar Mercer was seen in the clinic today for an audiological evaluation.  Ms. Mercer reported ear pressure and hearing loss of the right ear.    Audiological testing revealed a moderate rising to mild conductive hearing loss rising to normal hearing, with a mild threshold noted at 3000 Hz, for the right ear and normal hearing sensitivity for the left ear.  A speech reception threshold was obtained at 15 dBHL for the right ear and at 10 dBHL for the left ear.  Speech discrimination was 100% for the right ear and 100% for the left ear.  No suprathreshold bone conduction scores noted.    Tympanometry testing revealed a Type B (large ear canal volume) tympanogram for the right ear and a Type A tympanogram for the left ear.      Recommendations:  1. Otologic evaluation  2. Annual audiological evaluation  3. Hearing protection when in noise

## 2024-03-26 ENCOUNTER — OFFICE VISIT (OUTPATIENT)
Dept: NEUROLOGY | Facility: CLINIC | Age: 54
End: 2024-03-26
Payer: COMMERCIAL

## 2024-03-26 ENCOUNTER — PATIENT MESSAGE (OUTPATIENT)
Dept: NEUROLOGY | Facility: CLINIC | Age: 54
End: 2024-03-26

## 2024-03-26 VITALS
RESPIRATION RATE: 17 BRPM | HEART RATE: 73 BPM | BODY MASS INDEX: 29.95 KG/M2 | HEIGHT: 67 IN | DIASTOLIC BLOOD PRESSURE: 88 MMHG | TEMPERATURE: 97 F | WEIGHT: 190.81 LBS | SYSTOLIC BLOOD PRESSURE: 139 MMHG

## 2024-03-26 DIAGNOSIS — R74.01 ELEVATED ALT MEASUREMENT: ICD-10-CM

## 2024-03-26 DIAGNOSIS — H65.93 BILATERAL SEROUS OTITIS MEDIA, UNSPECIFIED CHRONICITY: ICD-10-CM

## 2024-03-26 DIAGNOSIS — K21.9 GASTROESOPHAGEAL REFLUX DISEASE WITHOUT ESOPHAGITIS: ICD-10-CM

## 2024-03-26 DIAGNOSIS — E04.2 MULTINODULAR GOITER: ICD-10-CM

## 2024-03-26 DIAGNOSIS — F41.9 ANXIETY: ICD-10-CM

## 2024-03-26 DIAGNOSIS — G43.809 VESTIBULAR MIGRAINE: ICD-10-CM

## 2024-03-26 DIAGNOSIS — G43.019 INTRACTABLE MIGRAINE WITHOUT AURA AND WITHOUT STATUS MIGRAINOSUS: Primary | ICD-10-CM

## 2024-03-26 DIAGNOSIS — I10 PRIMARY HYPERTENSION: ICD-10-CM

## 2024-03-26 PROCEDURE — 1159F MED LIST DOCD IN RCRD: CPT | Mod: CPTII,S$GLB,, | Performed by: PSYCHIATRY & NEUROLOGY

## 2024-03-26 PROCEDURE — 3075F SYST BP GE 130 - 139MM HG: CPT | Mod: CPTII,S$GLB,, | Performed by: PSYCHIATRY & NEUROLOGY

## 2024-03-26 PROCEDURE — 3079F DIAST BP 80-89 MM HG: CPT | Mod: CPTII,S$GLB,, | Performed by: PSYCHIATRY & NEUROLOGY

## 2024-03-26 PROCEDURE — 3044F HG A1C LEVEL LT 7.0%: CPT | Mod: CPTII,S$GLB,, | Performed by: PSYCHIATRY & NEUROLOGY

## 2024-03-26 PROCEDURE — 99999 PR PBB SHADOW E&M-EST. PATIENT-LVL IV: CPT | Mod: PBBFAC,,, | Performed by: PSYCHIATRY & NEUROLOGY

## 2024-03-26 PROCEDURE — 3008F BODY MASS INDEX DOCD: CPT | Mod: CPTII,S$GLB,, | Performed by: PSYCHIATRY & NEUROLOGY

## 2024-03-26 PROCEDURE — 99205 OFFICE O/P NEW HI 60 MIN: CPT | Mod: S$GLB,,, | Performed by: PSYCHIATRY & NEUROLOGY

## 2024-03-26 RX ORDER — MONTELUKAST SODIUM 10 MG/1
10 TABLET ORAL NIGHTLY
COMMUNITY

## 2024-03-26 RX ORDER — TOPIRAMATE SPINKLE 15 MG/1
CAPSULE ORAL
Qty: 120 CAPSULE | Refills: 11 | Status: SHIPPED | OUTPATIENT
Start: 2024-03-26

## 2024-03-26 NOTE — PROGRESS NOTES
PATIENT ID     3/26/2024    Jomar Mercer is a 54 y.o. female.    REASON FOR VISIT / CHIEF COMPLAINT     Headache      HISTORY OF PRESENT ILLNESS     Jomar Mercer presents for assessment of right sided vestibular migraines with a history of chronic left sided migraines without aura. 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 ENT encouraged her to follow with Neurology for assessment of possible vestibular migraines prior to proceeding with surgery. She has experienced right sided hearing loss, tinnitus, dizziness, and auditory tinnitus in her right ear for approximately 8 months. She denies any associated pain besides the feeling of fullness in her right ear. Patient is concerned for her upcomming travel plans due to the pressure she feels in her ears.     Ms Mercer denies significant changes in her chronic left sided migraines without aura. She describes a throbbing sensation, which she rarely notices and it goes away with time. She takes Propranolol and Amlodipine for her hypertension, and Crestor for hypercholesterolemia. She was recently started on Sonata for sleep, but reports that she had better response with Klonopin. She stopped taking Periactin 2-3 years ago due to weight gain.       MEDICATIONS     Current Outpatient Medications   Medication Sig Dispense Refill    amLODIPine (NORVASC) 2.5 MG tablet Take 1 tablet (2.5 mg total) by mouth nightly. 30 tablet 2    black cohosh root extract 40 mg Cap Take 40 mg by mouth 2 (two) times daily as needed.  0    clobetasoL (TEMOVATE) 0.05 % external solution Apply topically.      fluticasone propionate (FLONASE) 50 mcg/actuation nasal spray 1 spray (50 mcg total) by Each Nostril route 2 (two) times a day. 16 g 11    levocetirizine (XYZAL) 5 MG tablet Take 1 tablet (5 mg total) by mouth every evening. 90 tablet 0    montelukast (SINGULAIR) 10 mg tablet Take 10 mg by mouth every  evening.      propranoloL (INDERAL) 20 MG tablet Take 1 tablet (20 mg total) by mouth every morning. 90 tablet 3    rosuvastatin (CRESTOR) 10 MG tablet Take 1 tablet (10 mg total) by mouth every evening. 90 tablet 3    zaleplon (SONATA) 10 MG capsule Take 1 capsule (10 mg total) by mouth nightly as needed. 90 capsule 0    topiramate (TOPAMAX) 15 MG capsule Take 1 po nightly for 1 wk, then 1 po bid for 1 wk, then 2 po nightly and 1 po in AM, and finally the goal dose of 2 po bid, for a total of 30 mg bid 120 capsule 11     No current facility-administered medications for this visit.         REVIEW OF SYSTEMS          EXAMINATIONS                 IMPRESSION / PLAN      Jomar Mercer is a 55 yo female presenting for assessment of vestibular migraines with a history of chronic migraines without aura. Associated symptoms align with ENT diagnosis of SCDS; however, vestibular migraines may contribute to her symptoms.    Follow up with vestibular therapy as referred by ENT  Continue Sonata for sleep

## 2024-03-26 NOTE — PROGRESS NOTES
Subjective:       Patient ID: Jomar Mercer is a 54 y.o. female.    Chief Complaint: Migraine    INTERVAL HISTORY 03/26/2024    Jomar Mercer is a 54 y//o woman known to me from years prior. SHe moved to the [facility] and is now back and looking forward to establish with me.  She reports a diagnosis of vestibular migraines diagnosed by her ENT specialist. 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   She has experienced right sided hearing loss, tinnitus, dizziness, and auditory tinnitus in her right ear for approximately 8 months. She denies any associated pain besides the feeling of fullness in her right ear. Patient is concerned for her upcomming travel plans due to the pressure she feels in her ears.     Ms Mercer denies significant changes in her chronic left sided migraines without aura. She describes a throbbing sensation, which she rarely notices and it goes away with time. She takes Propranolol and Amlodipine for her hypertension, and Crestor for hypercholesterolemia. She was recently started on Sonata for sleep, but reports that she had better response with Klonopin. She stopped taking Periactin 2-3 years ago due to weight gain.   Please see details of headache characteristics below.    Headache questionnaire    1. When did your Headaches start?    N/A      2. Where are your headaches located?   Left side-forehead      3. Your headache's characteristics:   Throbbing      4. How long does the headache last?   Days      5. How often does the headache occur?   weekly      6. Are your headaches preceded or accompanied by other symptoms? no    If yes, please describe.  N/A      7. Does the headache awaken you at night? no   If so, how often? N/A        8. Please jabari the word that best describes your headache's intensity:    mild      9. Using a scale of 1 through 10, with 0 = no pain and 10 = the worst pain:   What score is  your headache now? 2   What score is your headache at its worst? 6   What score is your headache at its best? 1        10. Possible associated headache symptoms:  []  Sensitivity to light  [x] Dizziness BALANCE  [x] Nasal or sinus pressure/ pain   [] Sensitivity to noise  [] Vertigo  [] Problems with concentration  [] Sensitivity to smells  [x] Ringing in ears  [] Problems with memory    [] Blurred vision  [] Irritability  [] Problems with task completion   [] Double vision  [] Anger  []  Problems with relaxation  [] Loss of appetite  [] Anxiety  [] Neck tightness, Neck pain  [] Nausea   [x] Nasal congestion  [] Vomiting         11. Headache improving factors:  [x] Sleep  [] Heat  [] Darkness  [] Ice  [x] Local pressure [] Menses (period)  [] Massage   [] Medications:        12. Headache worsening factors:   [x] Fatigue [] Sneezing  [x] Changes in Weather  [] Light [] Bending Over [x] Stress  [] Noise [] Ovulation  [] Multiple Sclerosis Flare-Up  [] Smells  [] Menses  [] Food   [] Coughing [] Alcohol      13. Number of caffeinated drinks per day: 10 oz.      14. Number of diet drinks per day:  N/A      15. Have you seen any other [facility]ists within the last 3 years?  no   16. Bowel Habits: Normal    Please Check any Medications or Procedures tried/failed for Headache    AED Neuromodulators  MAOIs  Ergot Alkaloids    Acetazolamide (Diamox) [] Phenelzine (Nardil) [] Dihydroergotamine (Migranal) []   Carbamazepine (Tegretol) [] Tranylcypromine (Parnate) [] Ergotamine (Ergomar) []   Gabapentin (Neurontin) [] Antihistamine/Serotonergic  Triptans    Lacosamide (Vimpat) [] Cyproheptadine (Periactin) [x] Almotriptan (Axert) []   Lamotrigine (Lamictal) [] Antihypertensives  Eletriptan (Relpax) []   Levatiracetam (Keppra) [] Atenolol (Tenormin) [] Frovatriptan (Frova) []   Oxcarbazepine (Trileptal) [] Bisoprostol (Zebeta) [] Naratriptan (Amerge) []   Phenobarbital [] Candesartan (Atacand) [] Rizatriptan (Maxalt) []      Nebivolol (Bystolic)  Sumatriptan (Imitrex) []   Levetiracetam (Keppra)  Cardeilol (Coreg) [] Zolmitriptan (Zomig) []   Phenytoin (Dilantin) [] Diltiazem (Cardizem) []     Pregabalin (Lyrica) [] Lisinopril (Prinivil, Zestril) [] Combo Abortives    Primidone (Mysoline) [] Metoprolol (Toprol) [] BC Powder []   Tiagabine (Gabatril) [] Nadolol (Corgard) [] Butalbital and Acetaminophen (Bupap) []   Topiramate (Topamax)  (Trokendi) [] Nicardipine (Cardene) []     Vigabatrin (Sabril) [] Nimodipine (Nimotop) [] Butalbital, Acetaminophen, and caffeine (Fioricet) []   Valproic Acid (Depakote) (Divalproex Sodium) [] Propranolol (Inderal) []     Zonisamide (Zonegran) [] Telmisartan (Micardis) [] Butalbital, Aspirin, and caffeine (Fiorinal) []   Benzodiazepines  Timolol (Blocadren) []     Alprazolam (Xanax) [] Verapamil (Calan, Verelan) [] Butalbital, Caffeine, Acetaminophen, and Codeine (Fioricet with Codeine) []   Diazepam (Valium) [x] NSAIDs      Lorazepam (Ativan) [] Acetaminophen (Tylenol) []     Clonazepam (Klonopin) [x] Acetylsalicylic Acid (Aspirin) [] Butalbital, Caffeine, Aspirin, and Codeine  (Fiorinal with Codeine) []   Antidepressants  Diclofenac (Cambia) []     Amitriptyline (Elavil) [] Ibuprofen (Motrin) []     Desipramine (Norpramin) [] Indomethacin (Indocin) [] Aspirin, Caffeine, and Acetaminophen (Excedrin) (Goodys) []   Doxepin (Sinequan) [] Ketoprofen (Orudis) []     Fluoxetine (Prozac) [] Ketorolac (Toradol) [] Acetaminophen, Dichloralphenazone, and Isometheptene (Midrin) []   Imipramine (Tofranil) [] Naproxen (Anaprox) (Aleve) []     Nortriptyline (Pamelor) [] Meclofenamic Acid (Meclomen) []     Venlafaxine (Effexor) [] Meloxicam (Mobic) [] Procedures    Desvenlafazine (Pristiq) [] Monoclonals  Greater occipital nerve block []   Duloxetine (Cymbalta) [] Eptinezumab [] Cervical, Thoracic, Lumbar radiofrequency ablation []   Trazadone [] Erenumab-aooe (Aimovig) [] Spenopalatine ganglion block []  "  Wellbutrin [x] Galcanezumab (Emgality) [] Occipital neuro stimulation []   Protriptyline (Vivactil) [] Fremanazumab-vfrm (Ajovy)  Cervical, Thoracic, Lumbar, Caudal Epidural steroid injection []   Escitalopram (Lexapro) [] Other [] Sacroiliac joint steroid injection []   Celexa [] Memantine (Namenda) [] Transforaminal epidural steroid injection []   Gabapentin X Botox [] Facet joint injections []     Baclofen (Lioresal) [] Cervical, Thoracic, Lumbar medial branch blocks []       Cefaly []       Gamma Core []       Iovera []       Transcranial Magnetic stimulation []                        INTERVAL HISTORY 8/8/2019  The patient comes for follow up. She states that Periactin 2 mg po QHS was associated with nocturnal pruritus. She stopped taking and sent me a message. I then recommended low dose Diamox, this was effective controlling the throbbing component of her pain. She stopped taking when the throbbing went away. She is moving to [facility] at the end of the month. She is anxious about the move. She is having sleep difficulties. Otherwise information below is still accurate and current.      INTERVAL HISTORY 5/20/2019  The patient comes for follow up. She states that she is still having throbbing pain and intermittent numbness "between the hairline and the eyebrow, in the area of the throbbing. She notices that the pain is more pronounced after smoking. She is under stress as she is moving to [place], in [facility]. She states that is tolerating Periactin rather well at the 2 mg dose and is willing to increase it. Otherwise information below is still accurate and current.    INTERVAL HISTORY  The patient comes for follow up. She is still experiencing the throbbing sensation in the left side of the head, typically precipitated by smoking a cigarette and when exposed to bright lights. She is into "holistic" medications. Recently tried "DAYLIN." She prefers non pharmacological interventions. She is on " Klonopin 0.125 mg QHS for sleep. She is here to discuss options.    HPI  The patient is a pleasant 47 y/o female presenting with chief complaint of left supraorbital, high frontal throbbing sensation without pain. She also feels a mild sensation of numbness. The symptoms are present about 30% of the time untreated and about 10% of the time with the application of essential oils. She has suffered with this since 2013, followed at [facility] and diagnosed with Migraine. She comes with a CD containing an MRI and MRA of the brain for review. I have also reviewed previous records from [place]. She has had Cardiac work up including a stress test and an 2D ECHO both within normal limits. She has history of hypertension and is on Propranolol 10 mg BID. She admits that stress is a trigger as well as fatigue. She is a  at [employer]. Her PMHx is significant for a thyroid nodule, subcentimeter, followed by Dr. Mora. She is a smoker, trying to quit planning on starting Chantix. She has tried Gabapentin, Clonazepam, Cymbalta, and Elavil. Currently on low dose Clonazepam for anxiety  Please see details of headache characteristics below:  Headache questionnaire     1. When did your Headaches start?               2013        2. Where are your headaches located?              left        3. Your headache's characteristics:              Throbbing, Pounding     4. How long does the headache last?              (Patient did not answer)        5. How often does the headache occur?              daily        6. Are your headaches preceded or accompanied by other symptoms? yes              If yes, please describe.  Anxiety, numbness        7. Does the headache awaken you at night? no              If so, how often? N/A           8. Please jabari the word that best describes your headache's intensity:               (Patient did not answer)        9. Using a scale of 1 through 10, with 0 = no pain and 10 = the worst  pain:              What score is your headache now? 0              What score is your headache at its worst? 6              What score is your headache at its best? 0           10. Possible associated headache symptoms:  [x]  Sensitivity to light                  [] Dizziness                    [x] Nasal or sinus pressure/ pain              [x] Sensitivity to noise                 [] Vertigo                        [] Problems with concentration  [x] Sensitivity to smells   [] Ringing in ears           [] Problems with memory                        [x] Blurred vision             [x] Irritability                     [] Problems with task completion   [] Double vision             [] Anger              []  Problems with relaxation  [] Loss of appetite                     [x] Anxiety                       [x] Neck tightness, Neck pain  [] Nausea                                   [x] Nasal congestion  [] Vomiting                                         11. Headache improving factors:  [x] Sleep              [] Heat  [] Darkness                    [] Ice  [] Local pressure           [] Menses (period)  [x] Massage                    [] Medications:          12. Headache worsening factors:   [x] Fatigue           [] Sneezing                    [] Changes in Weather  [] Light   [] Bending Over [] Stress  [] Noise  [] Ovulation                    [] Multiple Sclerosis Flare-Up  [] Smells            [] Menses                      [] Food   [] Coughing        [] Alcohol        13. Number of caffeinated drinks per day: 2        14. Number of diet drinks per day:  0          Review of Systems   Constitutional: Positive for fatigue. Negative for activity change, appetite change and fever.   HENT: Negative for congestion, dental problem, hearing loss, sinus pressure, tinnitus, trouble swallowing and voice change.    Eyes: Negative for photophobia, pain, redness and visual disturbance.   Respiratory: Negative for cough, chest  "tightness and shortness of breath.    Cardiovascular: Positive for palpitations. Negative for chest pain and leg swelling.   Gastrointestinal: Negative for abdominal pain, blood in stool, nausea and vomiting.   Endocrine: Negative for cold intolerance and heat intolerance.   Genitourinary: Negative for difficulty urinating, frequency, menstrual problem and urgency.   Musculoskeletal: Negative for arthralgias, back pain, gait problem, joint swelling, myalgias, neck pain and neck stiffness.   Skin: Negative.    Neurological: Positive for numbness. Negative for dizziness, tremors, seizures, syncope, facial asymmetry, speech difficulty, weakness, light-headedness and headaches.   Hematological: Negative for adenopathy. Does not bruise/bleed easily.   Psychiatric/Behavioral: Negative for agitation, behavioral problems, confusion, decreased concentration, self-injury, sleep disturbance and suicidal ideas. The patient is not nervous/anxious and is not hyperactive.         Past Medical History:   Diagnosis Date    a Family H/O CAD     "SEs q5yr At Age 50"    a Palpitations     Dr. Roge Johnson 2015 Evaluation Was Negative    b Hypertension     5/9/16 RXd Low Salt Diet    d Family H/O DM     e Multinodular Goiter     Dr. Evan Mora Monitors With Yearly U/S    g Chronic Iron Deficiency     12/27/17 RXd An OTC "MVI With Iron" 1 Tab Daily; Likely From Menstrual Loss    g H/O Iron Deficiency Anemia     Her CBCs Are Normal On Daily PO Iron; Likely From Menstrual Loss    i 1/2 PPD X 20 YRs TUD     5/9/16 RXd OTC 2 Mg Nicotine Gum PRN    j Gallstone     7/26/16 Referred To Dr. Selma Crane, But She Is Deferring Sx For Now; 4/20/15 ABD U/S = 1 Mobile Gallstone W/O Inflammation    j GERD     Dr. Yovanny Robison; 6/8/15 EGD (Dr. MERNA Chun) = Normal Except For A Small Hiatal Hernia    j Mildly Elevated ALT Levels     Dr. Yovanny Robison; 12/23/16 RXd OTC Vitamin E 400 IU Daily; 5/27/16 HepABC And Ferritin = Normal/Not Elevated    j Small " Hiatal Hernia     Dr. Yovanny Robison; 6/8/15 EGD (Dr. MERNA Chun) = Normal Except For A Small Hiatal Hernia    m Migraines     16 Referred To Dr. Colin Carreon For 2nd Opinion; Dr. Marino (Neurology In [place]); On Clonazepam And Elavil For This    n Anxiety     On Clonazepam And Elavil For This    o Allergic Rhinosinusitis     q Vitamin D Deficiency     16 Increased Her OTC D3 To 5K IU Daily    Wellness Visit 2017      Past Surgical History:   Procedure Laterality Date     SECTION       Family History   Problem Relation Age of Onset    Stroke Mother     Hypertension Mother     Stroke Father     Heart disease Father     Hypertension Father     Kidney disease Father     Cancer Paternal Aunt      breast    Stroke Maternal Grandmother     Heart disease Paternal Grandfather      Social History     Social History    Marital status:      Spouse name: N/A    Number of children: N/A    Years of education: N/A     Occupational History    Not on file.     Social History Main Topics    Smoking status: Former Smoker     Packs/day: 0.25     Years: 25.00     Quit date: 2018    Smokeless tobacco: Never Used    Alcohol use No    Drug use: No    Sexual activity: Not on file     Other Topics Concern    Not on file     Social History Narrative    No narrative on file     Review of patient's allergies indicates:   Allergen Reactions    No known drug allergies        Current Outpatient Medications   Medication Sig    amLODIPine (NORVASC) 2.5 MG tablet Take 1 tablet (2.5 mg total) by mouth nightly.    black cohosh root extract 40 mg Cap Take 40 mg by mouth 2 (two) times daily as needed.    clobetasoL (TEMOVATE) 0.05 % external solution Apply topically.    fluticasone propionate (FLONASE) 50 mcg/actuation nasal spray 1 spray (50 mcg total) by Each Nostril route 2 (two) times a day.    levocetirizine (XYZAL) 5 MG tablet Take 1 tablet (5 mg total) by mouth every evening.    montelukast (SINGULAIR) 10  mg tablet Take 10 mg by mouth every evening.    propranoloL (INDERAL) 20 MG tablet Take 1 tablet (20 mg total) by mouth every morning.    rosuvastatin (CRESTOR) 10 MG tablet Take 1 tablet (10 mg total) by mouth every evening.    zaleplon (SONATA) 10 MG capsule Take 1 capsule (10 mg total) by mouth nightly as needed.    topiramate (TOPAMAX) 15 MG capsule Take 1 po nightly for 1 wk, then 1 po bid for 1 wk, then 2 po nightly and 1 po in AM, and finally the goal dose of 2 po bid, for a total of 30 mg bid     No current facility-administered medications for this visit.           Objective:      Vitals:    03/26/24 0905   BP: 139/88   Pulse: 73   Resp: 17   Temp: 97.4 °F (36.3 °C)     Body mass index is 29.88 kg/m².    Physical Exam    Constitutional:   She appears well-developed and well-nourished. She is well groomed    HENT:    Head: Atraumatic, oral and nasal mucosa intact  Eyes: Conjunctivae and EOM are normal. Pupils are equal, round, and reactive to light OU  Neck: Neck supple. No thyromegaly present  Cardiovascular: Normal rate and normal heart sounds  No murmur heard  Pulmonary/Chest: Effort normal and breath sounds normal  Musculoskeletal: Normal range of motion. No joint stiffness. No vertebral point tenderness  Skin: Skin is warm and dry  Psychiatric: Normal mood and affect     Neuro exam:    Mental status:  Awake, attentive, Alert, oriented to self, place, year and month  Language function is intact    Cranial Nerves:  Smell was not formally evaluated  Cranial Nerves II - XII: intact  Pursuits were smooth, normal saccades, no nystagmus OU  Motor - facial movement was symmetrical and normal     Palate moved well and was symmetrical with normal palatal and oral sensation  Tongue movements were full    Coordination:     Rapid alternating movements and rapid finger tapping - normal bilaterally  Finger to nose - normal and symmetric bilaterally   Heel to shin test - normal and symmetric bilaterally   Arm roll -  smooth and symmetric   No intentional or positional tremor.     Motor:  Normal muscle bulk and symmetry. No fasciculations were noted    No pronator drift  Strength 5/5 bilaterally     Reflexes:  Tendon reflexes were 3 + at biceps, triceps, brachioradialis, patellar, and Achilles bilaterally  On plantar stimulation toes were down going bilaterally  No clonus was noted     Sensory:Decreased 20% to pp on the left hemibody including the face.     Gait: Romberg absent. Normal gait. Normal arm swing and turns. Good tandem    Review of Data:  Lab Results   Component Value Date     01/05/2024     06/18/2015    K 4.2 01/05/2024    K 4.2 06/18/2015    MG 2.1 06/18/2015     01/05/2024     06/18/2015    CO2 23 01/05/2024    BUN 19 (H) 01/05/2024    CREATININE 0.84 01/05/2024    CREATININE 0.72 06/18/2015    GLU 98 01/05/2024    HGBA1C 5.0 01/05/2024    AST 33 01/05/2024    AST 20 03/21/2016    ALT 58 (H) 01/05/2024    ALBUMIN 4.6 01/05/2024    ALBUMIN 4.3 06/18/2015    PROT 7.5 01/05/2024    BILITOT 0.9 01/05/2024    CHOL 279 (H) 01/05/2024    HDL 80 (H) 01/05/2024    LDLCALC 166.4 (H) 01/05/2024    LDLCALC 132 (H) 10/28/2022    TRIG 163 (H) 01/05/2024       Lab Results   Component Value Date    WBC 4.35 01/05/2024    HGB 13.6 01/05/2024    HCT 41.8 01/05/2024    MCV 87 01/05/2024     01/05/2024       Lab Results   Component Value Date    TSH 0.829 01/05/2024         I reviewed images of MRA and MRI of the brain and shared the images with the patient. There are multiple foci of white matter lesions likely representing gliosis.  Hypercoag work up, also entirely negative      Assessment and Plan   The patient meets criteria for migraine without aura and vestibular migraine.    She has a negative cardiac work up. Hypercoagulable work up entirely negative  Hypertension, on Amlodipine and Propranolol    Agree with Vestibular therapy, per ENT  WeatherX foor air travel and atmospheric pressure changes  headaches  Start low dose Topamax, 0-15, 15-15, 15-30, 30-30 at weekly intervals  Magnesium (Calm) for sleep and added prevention  Counseling:  I spent 60 minutes on the day of this encounter preparing, treating, and managing this patient with intractable migraine headaches.            Loree Bhandari M.D  Medical Director, Headache and Facial Pain  [facility]

## 2024-03-26 NOTE — PROGRESS NOTES
Headache questionnaire    1. When did your Headaches start?    N/A      2. Where are your headaches located?   Left side-forehead      3. Your headache's characteristics:   Throbbing      4. How long does the headache last?   Days      5. How often does the headache occur?   weekly      6. Are your headaches preceded or accompanied by other symptoms? no    If yes, please describe.  N/A      7. Does the headache awaken you at night? no   If so, how often? N/A        8. Please jabari the word that best describes your headache's intensity:    mild      9. Using a scale of 1 through 10, with 0 = no pain and 10 = the worst pain:   What score is your headache now? 2   What score is your headache at its worst? 6   What score is your headache at its best? 1        10. Possible associated headache symptoms:  []  Sensitivity to light  [x] Dizziness BALANCE  [x] Nasal or sinus pressure/ pain   [] Sensitivity to noise  [] Vertigo  [] Problems with concentration  [] Sensitivity to smells  [x] Ringing in ears  [] Problems with memory    [] Blurred vision  [] Irritability  [] Problems with task completion   [] Double vision  [] Anger  []  Problems with relaxation  [] Loss of appetite  [] Anxiety  [] Neck tightness, Neck pain  [] Nausea   [x] Nasal congestion  [] Vomiting         11. Headache improving factors:  [x] Sleep  [] Heat  [] Darkness  [] Ice  [x] Local pressure [] Menses (period)  [] Massage   [] Medications:        12. Headache worsening factors:   [x] Fatigue [] Sneezing  [x] Changes in Weather  [] Light [] Bending Over [x] Stress  [] Noise [] Ovulation  [] Multiple Sclerosis Flare-Up  [] Smells  [] Menses  [] Food   [] Coughing [] Alcohol      13. Number of caffeinated drinks per day: 10 oz.      14. Number of diet drinks per day:  N/A      15. Have you seen any other [facility]ists within the last 3 years?  no   16. Bowel Habits: Normal    Please Check any Medications or Procedures tried/failed for Headache    AED  Neuromodulators  MAOIs  Ergot Alkaloids    Acetazolamide (Diamox) [] Phenelzine (Nardil) [] Dihydroergotamine (Migranal) []   Carbamazepine (Tegretol) [] Tranylcypromine (Parnate) [] Ergotamine (Ergomar) []   Gabapentin (Neurontin) [] Antihistamine/Serotonergic  Triptans    Lacosamide (Vimpat) [] Cyproheptadine (Periactin) [x] Almotriptan (Axert) []   Lamotrigine (Lamictal) [] Antihypertensives  Eletriptan (Relpax) []   Levatiracetam (Keppra) [] Atenolol (Tenormin) [] Frovatriptan (Frova) []   Oxcarbazepine (Trileptal) [] Bisoprostol (Zebeta) [] Naratriptan (Amerge) []   Phenobarbital [] Candesartan (Atacand) [] Rizatriptan (Maxalt) []     Nebivolol (Bystolic)  Sumatriptan (Imitrex) []   Levetiracetam (Keppra)  Cardeilol (Coreg) [] Zolmitriptan (Zomig) []   Phenytoin (Dilantin) [] Diltiazem (Cardizem) []     Pregabalin (Lyrica) [] Lisinopril (Prinivil, Zestril) [] Combo Abortives    Primidone (Mysoline) [] Metoprolol (Toprol) [] BC Powder []   Tiagabine (Gabatril) [] Nadolol (Corgard) [] Butalbital and Acetaminophen (Bupap) []   Topiramate (Topamax)  (Trokendi) [] Nicardipine (Cardene) []     Vigabatrin (Sabril) [] Nimodipine (Nimotop) [] Butalbital, Acetaminophen, and caffeine (Fioricet) []   Valproic Acid (Depakote) (Divalproex Sodium) [] Propranolol (Inderal) []     Zonisamide (Zonegran) [] Telmisartan (Micardis) [] Butalbital, Aspirin, and caffeine (Fiorinal) []   Benzodiazepines  Timolol (Blocadren) []     Alprazolam (Xanax) [] Verapamil (Calan, Verelan) [] Butalbital, Caffeine, Acetaminophen, and Codeine (Fioricet with Codeine) []   Diazepam (Valium) [x] NSAIDs      Lorazepam (Ativan) [] Acetaminophen (Tylenol) []     Clonazepam (Klonopin) [x] Acetylsalicylic Acid (Aspirin) [] Butalbital, Caffeine, Aspirin, and Codeine  (Fiorinal with Codeine) []   Antidepressants  Diclofenac (Cambia) []     Amitriptyline (Elavil) [] Ibuprofen (Motrin) []     Desipramine (Norpramin) [] Indomethacin (Indocin) [] Aspirin,  Caffeine, and Acetaminophen (Excedrin) (Goodys) []   Doxepin (Sinequan) [] Ketoprofen (Orudis) []     Fluoxetine (Prozac) [] Ketorolac (Toradol) [] Acetaminophen, Dichloralphenazone, and Isometheptene (Midrin) []   Imipramine (Tofranil) [] Naproxen (Anaprox) (Aleve) []     Nortriptyline (Pamelor) [] Meclofenamic Acid (Meclomen) []     Venlafaxine (Effexor) [] Meloxicam (Mobic) [] Procedures    Desvenlafazine (Pristiq) [] Monoclonals  Greater occipital nerve block []   Duloxetine (Cymbalta) [] Eptinezumab [] Cervical, Thoracic, Lumbar radiofrequency ablation []   Trazadone [] Erenumab-aooe (Aimovig) [] Spenopalatine ganglion block []   Wellbutrin [x] Galcanezumab (Emgality) [] Occipital neuro stimulation []   Protriptyline (Vivactil) [] Fremanazumab-vfrm (Ajovy)  Cervical, Thoracic, Lumbar, Caudal Epidural steroid injection []   Escitalopram (Lexapro) [] Other [] Sacroiliac joint steroid injection []   Celexa [] Memantine (Namenda) [] Transforaminal epidural steroid injection []   Gabapentin X Botox [] Facet joint injections []     Baclofen (Lioresal) [] Cervical, Thoracic, Lumbar medial branch blocks []       Cefaly []       Gamma Core []       Iovera []       Transcranial Magnetic stimulation []

## 2024-04-04 ENCOUNTER — CLINICAL SUPPORT (OUTPATIENT)
Dept: REHABILITATION | Facility: HOSPITAL | Age: 54
End: 2024-04-04
Attending: OTOLARYNGOLOGY
Payer: COMMERCIAL

## 2024-04-04 DIAGNOSIS — R42 DIZZY SPELLS: ICD-10-CM

## 2024-04-04 DIAGNOSIS — G43.809 VESTIBULAR MIGRAINE: ICD-10-CM

## 2024-04-04 DIAGNOSIS — R26.89 IMBALANCE: Primary | ICD-10-CM

## 2024-04-04 PROCEDURE — 97112 NEUROMUSCULAR REEDUCATION: CPT | Mod: PO | Performed by: PHYSICAL THERAPIST

## 2024-04-04 PROCEDURE — 97161 PT EVAL LOW COMPLEX 20 MIN: CPT | Mod: PO | Performed by: PHYSICAL THERAPIST

## 2024-04-04 NOTE — PATIENT INSTRUCTIONS
Gaze Stabilization: Tip Card  1. Target must remain in focus, not blurry, and appear stationary while head is in motion.  2. Perform exercises with small head movements (45° to either side of midline).  3. Increase speed of head motion so long as target is in focus.  4. If you wear eyeglasses, be sure you can see target through lens (therapist will give specific instructions for bifocal / progressive lenses).  5. These exercises may provoke dizziness or nausea. Work through these symptoms. If too dizzy, slow head movement slightly. Rest between each exercise.  6. Exercises demand concentration; avoid distractions.  7. For safety, perform standing exercises close to a counter, wall, corner, or next to someone.    Copyright © I. All rights reserved.       Gaze Stabilization: Sitting    Keeping eyes on target on plain wall arms' length away, tilt head slightly down and move head side to side for 30 seconds or until symptoms start. Repeat while moving head up and down for 30 seconds or until symptoms start.  Do 2 sessions per day.        Oculomotor: Saccades    Holding two targets positioned side by side shoulder width apart, move eyes quickly from target to target as head stays still. Change targets to hold one above the other, about 8-10 inches apart.  Move 30 seconds each direction or until symptoms start.  Perform sitting.  Repeat 3 times per session. Do 2 sessions per day.         Compensatory Strategies: Corrective Saccades    1. Holding two stationary targets (or thumbs) shoulder width apart, move eyes to target, keep head still.  2. Then slowly move head in direction of target while eyes remain on target.  3/4. Repeat in opposite direction.  Perform sitting. Repeat sequence 10 times per session. Do 2 sessions per day.

## 2024-04-04 NOTE — PLAN OF CARE
[facility]   Physical Therapy Initial Evaluation - Balance & Vestibular   Name: Jomar Mercer  Clinic Number: [id number]    Therapy Diagnosis:   Encounter Diagnoses   Name Primary?    Vestibular migraine     Imbalance Yes    Dizzy spells      Physician: Thien French MD    Physician Orders: PT Eval and Treat   Medical Diagnosis from Referral:   Vestibular migraine  Evaluation Date: 4/4/2024  Authorization Period Expiration: 03/22/2025  Plan of Care Expiration: 05/31/2024  Progress Note Due: 05/01/2024  Visit # / Visits authorized: 1   FOTO: 1/3    Precautions: Standard     Time In: 0700  Time Out: 0800  Total Appointment Time (timed & untimed codes): 60 minutes    Subjective      Date of onset: 03/26/2024    History of current condition - Jomar reports: having imbalance, dizziness with right side of body/ear. No falls noted. Patient reported getting work up finding out whether she has SCDC or something else causing the imbalance/dizziness, motion intolerance.    Vertigo (spinning/external) -  Imbalance (unsteadiness) +  Dizziness(light-headed/pass out/internal) =  Spontaneous -     Positional changes -    How long did your initial episode last?   Sec: BBPV, SCD +  Min: BPPV, TIA +  Hours: Meniere's, v-neuritis, labyrinthitis -  Days: neuritis, labyrinthitis, vestibular ischemia -  Weeks: CNS(cardiovascular, meds, psychiatric) -    Falls: none    Does sneezing, holding your breath, coughing exacerbate dizziness?  SCD -  Sensitivity to light, sounds/odors with dizziness? HA? -    Imaging, : 1/17/24  IMPRESSION:   Absent oVEMPs. Absent oVEMPs are suggestive of an abnormality of the utricle and/or superior vestibular nerve.  Absent cVEMPs.  Absent cVEMPs are suggestive of an abnormality of the saccule and/or inferior vestibular nerve.   Prior Therapy: none  Social History:  lives with their family  Occupation: Works for the school, teacher  Prior Level of Function: independent  Current Level  "of Function: modified independent, increase time noted with home management,     Pain:  Current 0/10, worst 6/10, best 0/10   Location: right knee   Description: Aching, Dull, and Variable  Aggravating Factors: Standing  Easing Factors: rest    Medical History:   Past Medical History:   Diagnosis Date    a Family H/O Early CAD     1/4/24 RXd ASA 81 Mg Daily    a Palpitations     Dr. Roge Johnson 2015 Evaluation Was Negative    b Hypertension     5/9/16 RXd Low Salt Diet    b Hypotension     2/13/19 D/Cd HCTZ 25 Mg qAM    c Hypercholesterolemia 01/17/2024 1/17/24 RXd Crestor 10 Mg qHS With Labs In 6 Weeks    d Family H/O Diabetes Mellitus 02/19/2024    e Multinodular Goiter     Dr. Evan Mora Monitors With Yearly U/S    g Chronic Iron Deficiency     12/27/17 RXd An OTC "MVI With Iron" 1 Tab Daily; Likely From Menstrual Loss    g H/O Iron Deficiency Anemia     Her CBCs Are Normal On Daily PO Iron; Likely From Menstrual Loss    i 1/3 PPD X 30 Years Tobacco Use Disorder 01/04/2024 1/4/24 I Advocated Smoking Cessation; 5/9/16 RXd OTC 2 Mg Nicotine Gum PRN    j GERD     Dr. Yovanny Robison; 6/8/15 EGD (Dr. MERNA Chun) = Normal Except For A Small Hiatal Hernia    j H/O Laparoscopic Cholecystectomy In 2021     Was Performed In [facility]    j Mildly Elevated ALT Levels     Dr. Yovanny Robison; 12/23/16 RXd OTC Vitamin E 400 IU Daily; 5/27/16 HepABC And Ferritin = Normal/Not Elevated    j Small Hiatal Hernia     Dr. Yovanny Robison; 6/8/15 EGD (Dr. MERNA Chun) = Normal Except For A Small Hiatal Hernia    k Perimenopausal     2/19/24    m Migraines     12/23/16 Referred To Dr. Colin Carreon For 2nd Opinion; Dr. Marino (Neurology In [place]); On Clonazepam And Elavil For This    n Anxiety     On Clonazepam And Elavil For This    n Primary Insomnia 02/19/2024 2/19/24 RXd Sonata 10 Mg QHS PRN    o Allergic Rhinosinusitis     o Bilateral Chronic Serous Otitis Media     q Vitamin D Deficiency     12/23/16 Increased Her OTC D3 To 5K " "IU Daily    Wellness Visit 2024        Surgical History:   Jomar Mercer  has a past surgical history that includes  section; Cholecystectomy (10/2021); and tubes in ears (2023).    Medications:   Jomar has a current medication list which includes the following prescription(s): amlodipine, black cohosh root extract, clobetasol, fluticasone propionate, levocetirizine, montelukast, propranolol, rosuvastatin, topiramate, and zaleplon.    Allergies:   Review of patient's allergies indicates:   Allergen Reactions    Cefdinir      Other reaction(s): GI Upset  Other reaction(s): GI Upset, Stomach ache          SYSTEMS SCREEN  Red Flags:  - Have you experienced unexplained: abnormal fatigue, SOB, slurred speech, difficulty swallowing, blurred vision, numbness/tingling, poor coordination, unexplained weight loss/gain, unexplained weakness/loss of strength/legs, bowel bladder difficulty, passing out and or lost consciousness? -    Hearing loss:  + (SCD, Menieres, Labrynthitis)    Patients goals: To work on balance, leg strength     Objective      - Follows commands: 100% of time   - Speech: no deficits     Mental status: alert, oriented to person, place, and time  Appearance: Casually dressed  Behavior:  calm  Attention Span and Concentration:  Normal    Posture observation:  Sitting: lordotic/neutral/kyphotic: kyphotic    Strength: manual muscle test grades below   Upper Extremity Strength: WNL   Lower Extremity Strength: WNL , right hip/knee 4/5 compared to LLE 5/5     CERVICAL SCREEN    VAST: Modified VAS (Vertebral Artery Screen), in sitting (rotation, then extension 10"):   (5 D's, 3 N'S)  R: Negative  L: Negative    Cervical ROM: Inclinometer/Goniometer/%                            Pain/dysfunction/movement  Flexion  Protraction  Retraction 0  0  25% Can't touch sternum to chin. Non-uniform curvature   Extension 25% Non-uniform curvature. Not within 10 degrees of parallel of the horizontal line " "  Lateral flexion (L)     Lateral flexion (R)     Right rotation 80 deg Chin/nose not in line with mid-clavicle. +/- 80 degrees   Left rotation 72 deg Chin/nose not in line with mid-clavicle. +/- 80 degrees     OA -  AA -      Sensation: Dermatomes:   Right Left Comment   C2/C3 (posterior head/neck) intact intact    C4 intact intact    C5 intact intact    C6 intact intact    C7 intact intact    C8 intact intact    T1 intact intact        Cervical Dizziness Test:    Cervical Torsion Test, H/N differentiation ( neck 45 deg, hold 30" each side, eyes closed ) +     VESTIBULAR EXAMINATION    Oculomotor Screen in room light (fixation present):   Known eye dysfunction: None   Ocular ROM: WNL    Tracking/Smooth Pursuits:   Vertical Plance: Intact   Horizontal Plane: Intact  Saccades:    Vertical Plane: Intact   Horizontal Plane: Intact    Convergence: WNL -     Spontaneous Nystagmus: Absent   Gaze Holding Nystagmus: Absent     VOR TESTING (Peripheral)  Head Impulse: (WNL/corrective saccade R/L) +    POSITIONAL CANAL TESTING  Looking for nystagmus (slow phase followed by quick phase to the affected side for BPPV)    Grant Hallpike (posterior / CL anterior)  Right : Negative nystagmus, Negative dizziness > 2 min, Nausea -  Left: Negative nystagmus, Negative dizziness > 2 min, Nausea 1  Horizontal Canals (Lateral Body Position)  Right:  nystagmus, Negative dizziness  Left:  nystagmus, Negative dizziness     Functional Gait Assessment: (Dynamic)  1. Gait on level surface =     (3) Normal: less than 5.5 sec, no A.D., no imbalance, normal gait pattern, deviates< 6in   (2) Mild impairment: 7-5.6 sec, uses A.D., mild gait deviations, or deviates 6-10 in   (1) Moderate impairment: > 7 sec, slow speed, imbalance, deviates 10-15 in.   (0) Severe impairment: needs assist, deviates >15 in, reach/touch wall  2. Change in Gait Speed =    (3) Normal: smooth change w/o loss of balance or gait deviation, deviates < 6 in, significant difference " between speeds   (2) Mild impairment: changes speed, but demonstrates mild gait deviations, deviates 6-10 in, OR no deviations but unable to significantly speed, OR uses A.D.   (1) Moderate impairment: minor changes to speed, OR changes speed w/ significant deviations, deviates 10-15 in, OR  Changes speed , but loses balance & recovers   (0) Severe impairment: cannot change speed, deviates >15 in, or loses balance & needs assist  3. Gait with horizontal head turns  =    (3) Normal: no change in gait, deviates <6 in   (2) Mild impairment: slight change in speed, deviates 6-10 in, OR uses A.D.   (1) Moderate impairment: moderate change in speed, deviates 10-15 in   (0) Severe impairment: severe disruption of gait, deviates >15in  4. Gait with vertical head turns =    (3) Normal: no change in gait, deviates <6 in   (2) Mild impairment: slight change in speed, deviates 6-10 in OR uses A.D.   (1) Moderate impairment: moderate change in speed, deviates 10-15 in   (0) Severe impairment: severe disruption of gait, deviates >15 in  5. Gait with pivot turns =    (3) Normal: performs safely in 3 sec, no LOB   (2) Mild impairment: performs in >3 sec & no LOB, OR turns safely & requires several steps to regain LOB   (1) Moderate impairment: turns slow, OR requires several small steps for balance following turn & stop   (0) Severe impairment: cannot turn safely, needs assist  6. Step over obstacle =    (3) Normal: steps over 2 stacked boxes w/o change in speed or LOB   (2) Mild impairment: able to step over 1 box w/o change in speed or LOB   (1) Moderate impairment: steps over 1 box but must slow down, may require VC   (0) Severe impairment: cannot perform w/o assist  7. Gait with Narrow LEVI =    (3) Normal: 10 steps no staggering   (2) Mild impairment: 7-9 steps   (1) Moderate impairment: 4-7 steps   (0) Severe impairment: < 4 steps or cannot perform w/o assist  8. Gait with eyes closed =    (3) Normal: < 7 sec, no A.D., no LOB,  "normal gait pattern, deviates <6 in   (2) Mild impairment: 7.1-9 sec, mild gait deviations, deviates 6-10 in   (1) Moderate impairment: > 9 sec, abnormal pattern, LOB, deviates 10-15 in   (0) Severe impairment: cannot perform w/o assist, LOB, deviates >15in  9. Ambulating Backwards =    (3) Normal: no A.D., no LOB, normal gait pattern, deviates <6in   (2) Mild impairment: uses A.D., slower speed, mild gait deviations, deviates 6-10 in   (1) Moderate impairment: slow speed, abnormal gait pattern, LOB, deviates 10-15 in   (0) Severe impairment: severe gait deviations or LOB, deviates >15in  10. Steps =    (3) Normal: alternating feet, no rail   (2) Mild Impairment: alternating feet, uses rail   (1) Moderate impairment: step-to, uses rail   (0) Severe impairment: cannot perform safely    Score /30     Score:   <22/30 fall risk   <20/30 fall risk in older adults   <18/30 fall risk in Parkinsons      Postural Stability/VSR:  LESLEE SOP TEST: (20" each)                       (N=Normal,S=Sway,F=Fall,R=Right,L=Left)  1.Eyes open/feet shoulder width/Firm                             N   2.Eyes closed/feet shoulder width/Firm                             N   3.Tandem stance/eyes open/firm                             N   4.Tandem stance/eyes closed/firm                             N   5.Eyes open/feet shoulder width/D-Airex                             N   6.Eyes closed/feet shoulder width/D-Airex                             S   7.Eyes closed/March                             N     Gait Assessment:(if indicated)  - AD used: none  - Assistance: independent  - Distance: 50    GAIT DEVIATIONS:  Jomar displays the following deviations with ambulation: no observable deviations.      Treatment      Total Treatment time (time-based codes) separate from Evaluation: 8 minutes      Jomar received the treatments listed below:      neuromuscular re-education activities to improve: Balance, Sense, Proprioception, Posture, and right hip/knee " for 8 minutes. The following activities were included:  SLR, bridge, clams with red band  Standing: VOR (horizontal)    Patient Education and Home Exercises       Education provided:   - Yes    Written Home Exercises Provided: yes. Exercises were reviewed and Jomar was able to demonstrate them prior to the end of the session.  Jomar demonstrated good  understanding of the education provided. See EMR under Patient Instructions for exercises provided during therapy sessions.      Assessment     Jomar is a 54 y.o. female referred to outpatient Physical Therapy with a medical diagnosis of Vestibular migraine. Patient presents with vestibular dysfunction    Patient prognosis is Good.   Patient will benefit from skilled outpatient Physical Therapy to address the deficits stated above and in the chart below, provide patient /family education, and to maximize patientt's level of independence.     Plan of care discussed with patient: Yes  Patient's spiritual, cultural and educational needs considered and patient is agreeable to the plan of care and goals as stated below:     Anticipated Barriers for therapy: none    Medical Necessity is demonstrated by the following  History  Co-morbidities and personal factors that may impact the plan of care Co-morbidities:   advanced age, anxiety, CAD, and HTN    Personal Factors:   no deficits     high   Examination  Body Structures and Functions, activity limitations and participation restrictions that may impact the plan of care Body Regions:   head  neck  lower extremities    Body Systems:    strength  balance  gait  transfers  transitions  motor control    Participation Restrictions:   Home management  Community ambulation    Activity limitations:   Learning and applying knowledge  no deficits    General Tasks and Commands  no deficits    Communication  no deficits    Mobility  walking    Self care  no deficits    Domestic Life  doing house work (cleaning house, washing dishes,  laundry)    Interactions/Relationships  no deficits    Life Areas  no deficits    Community and Social Life  no deficits         high   Clinical Presentation stable and uncomplicated low   Decision Making/ Complexity Score: low     Goals:  Short Term Goals: 4 weeks   -Patient will successfully perform supine to sit transfers without dizziness or instability to allow for safe bed mobility.  -Patient will perform sit to stand transfers from bedside with/without AD and ambulate 50 feet to the bathroom in a safe/steady manner without dizziness or instability.  -Reduce motion sensitivity by 50% for mobility purposes..  -Patient will report improvement in (DHI handicap) by 50% for functional mobility purposes.    Long Term Goals: 12 weeks   -Independent with HEP with no onset of s/s for mobility purposes.  -demonstrate hip/knee > 4/5 for ambulatory purposes.  - report > 25% reduction in dizzy/motion intolerant episodes with static to dynamic standing tasks.    Plan     Plan of care Certification: 4/4/2024 to 05/31/2024.    Outpatient Physical Therapy 2 times weekly for 8 weeks to include the following interventions: Gait Training, Manual Therapy, Neuromuscular Re-ed, Patient Education, Therapeutic Activities, and Therapeutic Exercise.     Andrei Garduno, PT

## 2024-04-05 ENCOUNTER — PATIENT MESSAGE (OUTPATIENT)
Dept: NEUROLOGY | Facility: CLINIC | Age: 54
End: 2024-04-05
Payer: COMMERCIAL

## 2024-04-09 ENCOUNTER — CLINICAL SUPPORT (OUTPATIENT)
Dept: REHABILITATION | Facility: HOSPITAL | Age: 54
End: 2024-04-09
Payer: COMMERCIAL

## 2024-04-09 DIAGNOSIS — R42 DIZZY SPELLS: ICD-10-CM

## 2024-04-09 DIAGNOSIS — R26.89 IMBALANCE: Primary | ICD-10-CM

## 2024-04-09 PROCEDURE — 97110 THERAPEUTIC EXERCISES: CPT | Mod: PO | Performed by: PHYSICAL THERAPIST

## 2024-04-09 PROCEDURE — 97112 NEUROMUSCULAR REEDUCATION: CPT | Mod: PO | Performed by: PHYSICAL THERAPIST

## 2024-04-09 NOTE — PROGRESS NOTES
"[facility]   Physical Therapy Treatment Note     Name: Jomar Mercer  Clinic Number: [id number]    Therapy Diagnosis:   Encounter Diagnoses   Name Primary?    Imbalance Yes    Dizzy spells      Physician: Thien French MD    Visit Date: 4/9/2024  Medical Diagnosis from Referral:   Vestibular migraine  Evaluation Date: 4/4/2024  Authorization Period Expiration: 03/22/2025  Plan of Care Expiration: 05/31/2024  Progress Note Due: 05/01/2024  Visit # / Visits authorized: 2  FOTO: 1/3     Precautions: Standard      Time In: 0700  Time Out: 0800  Total Appointment Time (timed & untimed codes): 38 minutes  SUBJECTIVE     Pt reports: no falls and has started back work as well..  She was compliant with home exercise program.  Response to previous treatment: muscle sorness  Functional change: too soon to tell    Pain: 0/10  Location: NA      OBJECTIVE     Objective Measures updated at progress report unless specified.   Cervical ROM: Inclinometer/Goniometer/%                            Pain/dysfunction/movement  Flexion  Protraction  Retraction 0  0  25% Can't touch sternum to chin. Non-uniform curvature   Extension 25% Non-uniform curvature. Not within 10 degrees of parallel of the horizontal line   Lateral flexion (L)       Lateral flexion (R)       Right rotation 80 deg Chin/nose not in line with mid-clavicle. +/- 80 degrees   Left rotation 72 deg Chin/nose not in line with mid-clavicle. +/- 80 degrees      Postural Stability/VSR:  LESLEE SOP TEST: (20" each)                       (N=Normal,S=Sway,F=Fall,R=Right,L=Left)  1.Eyes open/feet shoulder width/Firm                             N   2.Eyes closed/feet shoulder width/Firm                             N   3.Tandem stance/eyes open/firm                             N   4.Tandem stance/eyes closed/firm                             N   5.Eyes open/feet shoulder width/D-Airex                             N   6.Eyes closed/feet shoulder width/D-Airex     "                         S   7.Eyes closed/March                             N      VOR TESTING (Peripheral)  Head Impulse: (WNL/corrective saccade R/L) +     Lower Extremity Strength: WNL , right hip/knee 4/5 compared to LLE 5/5     Treatment     Jomar received the treatments listed below:      therapeutic exercises to develop strength, endurance, ROM, flexibility, posture, and core stabilization for 23 minutes including:  UBE 3'/3'  Supine: TA 2/10  Supine: TA/bridge with green band 2/10  S/L clams green band 2/10 each    Seated: cervical retraction/rotation x 10 each    neuromuscular re-education activities to improve: Balance, Kinesthetic, Sense, Proprioception, Posture, and vestibular for 15 minutes. The following activities were included:  Seated: VOR + cognition 3 x 1' + cognition  Standing: VOR + cognition 3 x 1' (horizontal), progress to d-airex    Patient Education and Home Exercises     Home Exercises Provided and Patient Education Provided     Education provided:   - Yes    Written Home Exercises Provided: yes. Exercises were reviewed and Jomar was able to demonstrate them prior to the end of the session.  Jomar demonstrated good  understanding of the education provided. See EMR under Patient Instructions for exercises provided during therapy sessions    ASSESSMENT     Patient was given instruction with cueing, tactile input for cervical mobility, standing VOR (horizontal head turns). Decreased cervical rotation with horizontal head turns but no adverse effects.    Jomar Is progressing well towards her goals.   Pt prognosis is Good.     Pt will continue to benefit from skilled outpatient physical therapy to address the deficits listed in the problem list box on initial evaluation, provide pt/family education and to maximize pt's level of independence in the home and community environment.     Pt's spiritual, cultural and educational needs considered and pt agreeable to plan of care and goals.      Anticipated barriers to physical therapy: none    Goals:  Short Term Goals: 4 weeks   -Patient will successfully perform supine to sit transfers without dizziness or instability to allow for safe bed mobility.  -Patient will perform sit to stand transfers from bedside with/without AD and ambulate 50 feet to the bathroom in a safe/steady manner without dizziness or instability.  -Reduce motion sensitivity by 50% for mobility purposes..  -Patient will report improvement in (DHI handicap) by 50% for functional mobility purposes.    PLAN     Continue with POC    Andrei Garduno, PT

## 2024-04-24 ENCOUNTER — CLINICAL SUPPORT (OUTPATIENT)
Dept: REHABILITATION | Facility: HOSPITAL | Age: 54
End: 2024-04-24
Attending: OTOLARYNGOLOGY
Payer: COMMERCIAL

## 2024-04-24 DIAGNOSIS — R26.89 IMBALANCE: Primary | ICD-10-CM

## 2024-04-24 DIAGNOSIS — R42 DIZZY SPELLS: ICD-10-CM

## 2024-04-24 PROCEDURE — 97110 THERAPEUTIC EXERCISES: CPT | Mod: PO | Performed by: PHYSICAL THERAPIST

## 2024-04-24 PROCEDURE — 97112 NEUROMUSCULAR REEDUCATION: CPT | Mod: PO | Performed by: PHYSICAL THERAPIST

## 2024-04-24 NOTE — PROGRESS NOTES
"[facility]   Physical Therapy Treatment Note     Name: Jomar Mercer  Clinic Number: [id number]    Therapy Diagnosis:   Encounter Diagnoses   Name Primary?    Imbalance Yes    Dizzy spells      Physician: Thien French MD    Visit Date: 4/24/2024  Medical Diagnosis from Referral:   Vestibular migraine  Evaluation Date: 4/4/2024  Authorization Period Expiration: 03/22/2025  Plan of Care Expiration: 05/31/2024  Progress Note Due: 05/01/2024  Visit # / Visits authorized: 2  FOTO: 1/3     Precautions: Standard      Time In: 1600  Time Out: 1700  Total Appointment Time (timed & untimed codes): 38 minutes    SUBJECTIVE     Pt reports: feeling better since last session. No vertigo. No dizziness noted. Patient will f/u with another MD on 2nd opinion as well.  She was compliant with home exercise program.  Response to previous treatment: muscle sorness  Functional change: walking with no dizziness noted    Pain: 0/10  Location: NA      OBJECTIVE     Objective Measures updated at progress report unless specified.   Cervical ROM: Inclinometer/Goniometer/%                            Pain/dysfunction/movement  Flexion  Protraction  Retraction 0  0  25% Can't touch sternum to chin. Non-uniform curvature   Extension 25% Non-uniform curvature. Not within 10 degrees of parallel of the horizontal line   Lateral flexion (L)       Lateral flexion (R)       Right rotation 80 deg Chin/nose not in line with mid-clavicle. +/- 80 degrees   Left rotation 72 deg Chin/nose not in line with mid-clavicle. +/- 80 degrees      Postural Stability/VSR:  LESLEE SOP TEST: (20" each)                       (N=Normal,S=Sway,F=Fall,R=Right,L=Left)  1.Eyes open/feet shoulder width/Firm                             N   2.Eyes closed/feet shoulder width/Firm                             N   3.Tandem stance/eyes open/firm                             N   4.Tandem stance/eyes closed/firm                             N   5.Eyes open/feet " "shoulder width/D-Airex                             N   6.Eyes closed/feet shoulder width/D-Airex                             S   7.Eyes closed/March                             N      VOR TESTING (Peripheral)  Head Impulse: (WNL/corrective saccade R/L) +     Lower Extremity Strength: WNL , right hip/knee 4/5 compared to LLE 5/5     Treatment     Jomar received the treatments listed below:      therapeutic exercises to develop strength, endurance, ROM, flexibility, posture, and core stabilization for 23 minutes including:  UBE 3'/3'  Supine: TA 2/10  Supine: TA/bridge with green band 2/10  S/L clams green band 2/10 each    Leg press: 40# DL 2/10, SL 20# 2/10 alternating    Seated:   Cervical retraction 3/20", 1/10, 2" hold  cervical retraction/rotation x 10 each    neuromuscular re-education activities to improve: Balance, Kinesthetic, Sense, Proprioception, Posture, and vestibular for 15 minutes. The following activities were included:  Seated: VOR + cognition 3 x 1' + cognition  Standing: VOR + cognition 3 x 1' (horizontal), d-airex    Patient Education and Home Exercises     Home Exercises Provided and Patient Education Provided     Education provided:   - Yes    Written Home Exercises Provided: yes. Exercises were reviewed and Jomar was able to demonstrate them prior to the end of the session.  Jomar demonstrated good  understanding of the education provided. See EMR under Patient Instructions for exercises provided during therapy sessions    ASSESSMENT     Patient demonstrated no vertigo/dizzy spells with VOR testing on unlevel ground at this time. No adverse effects.    Jomar Is progressing well towards her goals.   Pt prognosis is Good.     Pt will continue to benefit from skilled outpatient physical therapy to address the deficits listed in the problem list box on initial evaluation, provide pt/family education and to maximize pt's level of independence in the home and community environment.     Pt's " spiritual, cultural and educational needs considered and pt agreeable to plan of care and goals.     Anticipated barriers to physical therapy: none    Goals:  Short Term Goals: 4 weeks   -Patient will successfully perform supine to sit transfers without dizziness or instability to allow for safe bed mobility.  -Patient will perform sit to stand transfers from bedside with/without AD and ambulate 50 feet to the bathroom in a safe/steady manner without dizziness or instability.  -Reduce motion sensitivity by 50% for mobility purposes..  -Patient will report improvement in (DHI handicap) by 50% for functional mobility purposes.    PLAN     Continue with POC    Andrei Garduno, PT

## 2024-06-19 ENCOUNTER — OFFICE VISIT (OUTPATIENT)
Dept: NEUROLOGY | Facility: CLINIC | Age: 54
End: 2024-06-19
Payer: COMMERCIAL

## 2024-06-19 VITALS
HEIGHT: 67 IN | HEART RATE: 68 BPM | RESPIRATION RATE: 17 BRPM | BODY MASS INDEX: 30.06 KG/M2 | WEIGHT: 191.5 LBS | DIASTOLIC BLOOD PRESSURE: 98 MMHG | SYSTOLIC BLOOD PRESSURE: 161 MMHG

## 2024-06-19 DIAGNOSIS — H93.11 TINNITUS OF RIGHT EAR: ICD-10-CM

## 2024-06-19 DIAGNOSIS — I10 PRIMARY HYPERTENSION: ICD-10-CM

## 2024-06-19 DIAGNOSIS — R42 DIZZINESS: Primary | ICD-10-CM

## 2024-06-19 DIAGNOSIS — F41.9 ANXIETY: ICD-10-CM

## 2024-06-19 PROCEDURE — 3080F DIAST BP >= 90 MM HG: CPT | Mod: CPTII,S$GLB,, | Performed by: PSYCHIATRY & NEUROLOGY

## 2024-06-19 PROCEDURE — 99214 OFFICE O/P EST MOD 30 MIN: CPT | Mod: S$GLB,,, | Performed by: PSYCHIATRY & NEUROLOGY

## 2024-06-19 PROCEDURE — 3044F HG A1C LEVEL LT 7.0%: CPT | Mod: CPTII,S$GLB,, | Performed by: PSYCHIATRY & NEUROLOGY

## 2024-06-19 PROCEDURE — 1159F MED LIST DOCD IN RCRD: CPT | Mod: CPTII,S$GLB,, | Performed by: PSYCHIATRY & NEUROLOGY

## 2024-06-19 PROCEDURE — 3077F SYST BP >= 140 MM HG: CPT | Mod: CPTII,S$GLB,, | Performed by: PSYCHIATRY & NEUROLOGY

## 2024-06-19 PROCEDURE — 99999 PR PBB SHADOW E&M-EST. PATIENT-LVL IV: CPT | Mod: PBBFAC,,, | Performed by: PSYCHIATRY & NEUROLOGY

## 2024-06-19 PROCEDURE — 3008F BODY MASS INDEX DOCD: CPT | Mod: CPTII,S$GLB,, | Performed by: PSYCHIATRY & NEUROLOGY

## 2024-06-19 RX ORDER — CLONAZEPAM 0.5 MG/1
0.5 TABLET ORAL DAILY PRN
Qty: 20 TABLET | Refills: 3 | Status: SHIPPED | OUTPATIENT
Start: 2024-06-19 | End: 2025-06-19

## 2024-06-19 NOTE — PROGRESS NOTES
"Subjective:       Patient ID: Jomar Mercer is a 54 y.o. female.    Chief Complaint: Migraine  INTERVAL HISTORY 06/19/2024  Ms. Hadley presents for follow up. She denies headaches, also denies nausea, photophobia or phonophobia. She tried Topamax at a very low dose of 15 mg and stopped after 4 days, "I was not myself." Also attende a few sessions of vestibular therapy, not helpful. She has an appointment with ENT to discuss possible surgery for dehiscence of superior semicircular canal. She reports poor sleep. Ride side hearing loss, tinnitus, dizziness, persist.    INTERVAL HISTORY 03/26/2024  Jomar Mercer is a 54 y//o woman known to me from years prior. SHe moved to the [facility] and is now back and looking forward to establish with me.  She reports a diagnosis of vestibular migraines diagnosed by her ENT specialist. 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   She has experienced right sided hearing loss, tinnitus, dizziness, and auditory tinnitus in her right ear for approximately 8 months. She denies any associated pain besides the feeling of fullness in her right ear. Patient is concerned for her upcomming travel plans due to the pressure she feels in her ears.     Ms Mercer denies significant changes in her chronic left sided migraines without aura. She describes a throbbing sensation, which she rarely notices and it goes away with time. She takes Propranolol and Amlodipine for her hypertension, and Crestor for hypercholesterolemia. She was recently started on Sonata for sleep, but reports that she had better response with Klonopin. She stopped taking Periactin 2-3 years ago due to weight gain.   Please see details of headache characteristics below.    Headache questionnaire    1. When did your Headaches start?    N/A      2. Where are your headaches located?   Left side-forehead      3. Your headache's " characteristics:   Throbbing      4. How long does the headache last?   Days      5. How often does the headache occur?   weekly      6. Are your headaches preceded or accompanied by other symptoms? no    If yes, please describe.  N/A      7. Does the headache awaken you at night? no   If so, how often? N/A        8. Please jabari the word that best describes your headache's intensity:    mild      9. Using a scale of 1 through 10, with 0 = no pain and 10 = the worst pain:   What score is your headache now? 2   What score is your headache at its worst? 6   What score is your headache at its best? 1        10. Possible associated headache symptoms:  []  Sensitivity to light  [x] Dizziness BALANCE  [x] Nasal or sinus pressure/ pain   [] Sensitivity to noise  [] Vertigo  [] Problems with concentration  [] Sensitivity to smells  [x] Ringing in ears  [] Problems with memory    [] Blurred vision  [] Irritability  [] Problems with task completion   [] Double vision  [] Anger  []  Problems with relaxation  [] Loss of appetite  [] Anxiety  [] Neck tightness, Neck pain  [] Nausea   [x] Nasal congestion  [] Vomiting         11. Headache improving factors:  [x] Sleep  [] Heat  [] Darkness  [] Ice  [x] Local pressure [] Menses (period)  [] Massage   [] Medications:        12. Headache worsening factors:   [x] Fatigue [] Sneezing  [x] Changes in Weather  [] Light [] Bending Over [x] Stress  [] Noise [] Ovulation  [] Multiple Sclerosis Flare-Up  [] Smells  [] Menses  [] Food   [] Coughing [] Alcohol      13. Number of caffeinated drinks per day: 10 oz.      14. Number of diet drinks per day:  N/A      15. Have you seen any other [facility]ists within the last 3 years?  no   16. Bowel Habits: Normal    Please Check any Medications or Procedures tried/failed for Headache    AED Neuromodulators  MAOIs  Ergot Alkaloids    Acetazolamide (Diamox) [] Phenelzine (Nardil) [] Dihydroergotamine (Migranal) []   Carbamazepine (Tegretol)  [] Tranylcypromine (Parnate) [] Ergotamine (Ergomar) []   Gabapentin (Neurontin) [] Antihistamine/Serotonergic  Triptans    Lacosamide (Vimpat) [] Cyproheptadine (Periactin) [x] Almotriptan (Axert) []   Lamotrigine (Lamictal) [] Antihypertensives  Eletriptan (Relpax) []   Levatiracetam (Keppra) [] Atenolol (Tenormin) [] Frovatriptan (Frova) []   Oxcarbazepine (Trileptal) [] Bisoprostol (Zebeta) [] Naratriptan (Amerge) []   Phenobarbital [] Candesartan (Atacand) [] Rizatriptan (Maxalt) []     Nebivolol (Bystolic)  Sumatriptan (Imitrex) []   Levetiracetam (Keppra)  Cardeilol (Coreg) [] Zolmitriptan (Zomig) []   Phenytoin (Dilantin) [] Diltiazem (Cardizem) []     Pregabalin (Lyrica) [] Lisinopril (Prinivil, Zestril) [] Combo Abortives    Primidone (Mysoline) [] Metoprolol (Toprol) [] BC Powder []   Tiagabine (Gabatril) [] Nadolol (Corgard) [] Butalbital and Acetaminophen (Bupap) []   Topiramate (Topamax)  (Trokendi) [] Nicardipine (Cardene) []     Vigabatrin (Sabril) [] Nimodipine (Nimotop) [] Butalbital, Acetaminophen, and caffeine (Fioricet) []   Valproic Acid (Depakote) (Divalproex Sodium) [] Propranolol (Inderal) []     Zonisamide (Zonegran) [] Telmisartan (Micardis) [] Butalbital, Aspirin, and caffeine (Fiorinal) []   Benzodiazepines  Timolol (Blocadren) []     Alprazolam (Xanax) [] Verapamil (Calan, Verelan) [] Butalbital, Caffeine, Acetaminophen, and Codeine (Fioricet with Codeine) []   Diazepam (Valium) [x] NSAIDs      Lorazepam (Ativan) [] Acetaminophen (Tylenol) []     Clonazepam (Klonopin) [x] Acetylsalicylic Acid (Aspirin) [] Butalbital, Caffeine, Aspirin, and Codeine  (Fiorinal with Codeine) []   Antidepressants  Diclofenac (Cambia) []     Amitriptyline (Elavil) [] Ibuprofen (Motrin) []     Desipramine (Norpramin) [] Indomethacin (Indocin) [] Aspirin, Caffeine, and Acetaminophen (Excedrin) (Goodys) []   Doxepin (Sinequan) [] Ketoprofen (Orudis) []     Fluoxetine (Prozac) [] Ketorolac (Toradol) []  "Acetaminophen, Dichloralphenazone, and Isometheptene (Midrin) []   Imipramine (Tofranil) [] Naproxen (Anaprox) (Aleve) []     Nortriptyline (Pamelor) [] Meclofenamic Acid (Meclomen) []     Venlafaxine (Effexor) [] Meloxicam (Mobic) [] Procedures    Desvenlafazine (Pristiq) [] Monoclonals  Greater occipital nerve block []   Duloxetine (Cymbalta) [] Eptinezumab [] Cervical, Thoracic, Lumbar radiofrequency ablation []   Trazadone [] Erenumab-aooe (Aimovig) [] Spenopalatine ganglion block []   Wellbutrin [x] Galcanezumab (Emgality) [] Occipital neuro stimulation []   Protriptyline (Vivactil) [] Fremanazumab-vfrm (Ajovy)  Cervical, Thoracic, Lumbar, Caudal Epidural steroid injection []   Escitalopram (Lexapro) [] Other [] Sacroiliac joint steroid injection []   Celexa [] Memantine (Namenda) [] Transforaminal epidural steroid injection []   Gabapentin X Botox [] Facet joint injections []     Baclofen (Lioresal) [] Cervical, Thoracic, Lumbar medial branch blocks []       Cefaly []       Gamma Core []       Iovera []       Transcranial Magnetic stimulation []                        INTERVAL HISTORY 8/8/2019  The patient comes for follow up. She states that Periactin 2 mg po QHS was associated with nocturnal pruritus. She stopped taking and sent me a message. I then recommended low dose Diamox, this was effective controlling the throbbing component of her pain. She stopped taking when the throbbing went away. She is moving to [facility] at the end of the month. She is anxious about the move. She is having sleep difficulties. Otherwise information below is still accurate and current.      INTERVAL HISTORY 5/20/2019  The patient comes for follow up. She states that she is still having throbbing pain and intermittent numbness "between the hairline and the eyebrow, in the area of the throbbing. She notices that the pain is more pronounced after smoking. She is under stress as she is moving to [place], in [facility]. " "She states that is tolerating Periactin rather well at the 2 mg dose and is willing to increase it. Otherwise information below is still accurate and current.    INTERVAL HISTORY  The patient comes for follow up. She is still experiencing the throbbing sensation in the left side of the head, typically precipitated by smoking a cigarette and when exposed to bright lights. She is into "holistic" medications. Recently tried "DAYLIN." She prefers non pharmacological interventions. She is on Klonopin 0.125 mg QHS for sleep. She is here to discuss options.    HPI  The patient is a pleasant 47 y/o female presenting with chief complaint of left supraorbital, high frontal throbbing sensation without pain. She also feels a mild sensation of numbness. The symptoms are present about 30% of the time untreated and about 10% of the time with the application of essential oils. She has suffered with this since 2013, followed at [facility] and diagnosed with Migraine. She comes with a CD containing an MRI and MRA of the brain for review. I have also reviewed previous records from [place]. She has had Cardiac work up including a stress test and an 2D ECHO both within normal limits. She has history of hypertension and is on Propranolol 10 mg BID. She admits that stress is a trigger as well as fatigue. She is a  at [employer]. Her PMHx is significant for a thyroid nodule, subcentimeter, followed by Dr. Mora. She is a smoker, trying to quit planning on starting Chantix. She has tried Gabapentin, Clonazepam, Cymbalta, and Elavil. Currently on low dose Clonazepam for anxiety  Please see details of headache characteristics below:  Headache questionnaire     1. When did your Headaches start?               2013        2. Where are your headaches located?              left        3. Your headache's characteristics:              Throbbing, Pounding     4. How long does the headache last?              (Patient did not " answer)        5. How often does the headache occur?              daily        6. Are your headaches preceded or accompanied by other symptoms? yes              If yes, please describe.  Anxiety, numbness        7. Does the headache awaken you at night? no              If so, how often? N/A           8. Please jabari the word that best describes your headache's intensity:               (Patient did not answer)        9. Using a scale of 1 through 10, with 0 = no pain and 10 = the worst pain:              What score is your headache now? 0              What score is your headache at its worst? 6              What score is your headache at its best? 0           10. Possible associated headache symptoms:  [x]  Sensitivity to light                  [] Dizziness                    [x] Nasal or sinus pressure/ pain              [x] Sensitivity to noise                 [] Vertigo                        [] Problems with concentration  [x] Sensitivity to smells   [] Ringing in ears           [] Problems with memory                        [x] Blurred vision             [x] Irritability                     [] Problems with task completion   [] Double vision             [] Anger              []  Problems with relaxation  [] Loss of appetite                     [x] Anxiety                       [x] Neck tightness, Neck pain  [] Nausea                                   [x] Nasal congestion  [] Vomiting                                         11. Headache improving factors:  [x] Sleep              [] Heat  [] Darkness                    [] Ice  [] Local pressure           [] Menses (period)  [x] Massage                    [] Medications:          12. Headache worsening factors:   [x] Fatigue           [] Sneezing                    [] Changes in Weather  [] Light   [] Bending Over [] Stress  [] Noise  [] Ovulation                    [] Multiple Sclerosis Flare-Up  [] Smells            [] Menses                      [] Food   []  "Coughing        [] Alcohol        13. Number of caffeinated drinks per day: 2        14. Number of diet drinks per day:  0          Review of Systems   Constitutional: Positive for fatigue. Negative for activity change, appetite change and fever.   HENT: Negative for congestion, dental problem, hearing loss, sinus pressure, tinnitus, trouble swallowing and voice change.    Eyes: Negative for photophobia, pain, redness and visual disturbance.   Respiratory: Negative for cough, chest tightness and shortness of breath.    Cardiovascular: Positive for palpitations. Negative for chest pain and leg swelling.   Gastrointestinal: Negative for abdominal pain, blood in stool, nausea and vomiting.   Endocrine: Negative for cold intolerance and heat intolerance.   Genitourinary: Negative for difficulty urinating, frequency, menstrual problem and urgency.   Musculoskeletal: Negative for arthralgias, back pain, gait problem, joint swelling, myalgias, neck pain and neck stiffness.   Skin: Negative.    Neurological: Positive for numbness. Negative for dizziness, tremors, seizures, syncope, facial asymmetry, speech difficulty, weakness, light-headedness and headaches.   Hematological: Negative for adenopathy. Does not bruise/bleed easily.   Psychiatric/Behavioral: Negative for agitation, behavioral problems, confusion, decreased concentration, self-injury, sleep disturbance and suicidal ideas. The patient is not nervous/anxious and is not hyperactive.         Past Medical History:   Diagnosis Date    a Family H/O CAD     "SEs q5yr At Age 50"    a Palpitations     Dr. Roge Johnson 2015 Evaluation Was Negative    b Hypertension     5/9/16 RXd Low Salt Diet    d Family H/O DM     e Multinodular Goiter     Dr. Evan Mora Monitors With Yearly U/S    g Chronic Iron Deficiency     12/27/17 RXd An OTC "MVI With Iron" 1 Tab Daily; Likely From Menstrual Loss    g H/O Iron Deficiency Anemia     Her CBCs Are Normal On Daily PO Iron; Likely From " Menstrual Loss    i 1/2 PPD X 20 YRs TUD     16 RXd OTC 2 Mg Nicotine Gum PRN    j Gallstone     16 Referred To Dr. Selma Crane, But She Is Deferring Sx For Now; 4/20/15 ABD U/S = 1 Mobile Gallstone W/O Inflammation    j GERD     Dr. Yovanny Robison; 6/8/15 EGD (Dr. MERNA Chun) = Normal Except For A Small Hiatal Hernia    j Mildly Elevated ALT Levels     Dr. Yovanny Robison; 16 RXd OTC Vitamin E 400 IU Daily; 16 HepABC And Ferritin = Normal/Not Elevated    j Small Hiatal Hernia     Dr. Yovanny Robison; 6/8/15 EGD (Dr. MERNA Chun) = Normal Except For A Small Hiatal Hernia    m Migraines     16 Referred To Dr. Colin Carreon For 2nd Opinion; Dr. Marino (Neurology In [place]); On Clonazepam And Elavil For This    n Anxiety     On Clonazepam And Elavil For This    o Allergic Rhinosinusitis     q Vitamin D Deficiency     16 Increased Her OTC D3 To 5K IU Daily    Wellness Visit 2017      Past Surgical History:   Procedure Laterality Date     SECTION       Family History   Problem Relation Age of Onset    Stroke Mother     Hypertension Mother     Stroke Father     Heart disease Father     Hypertension Father     Kidney disease Father     Cancer Paternal Aunt      breast    Stroke Maternal Grandmother     Heart disease Paternal Grandfather      Social History     Social History    Marital status:      Spouse name: N/A    Number of children: N/A    Years of education: N/A     Occupational History    Not on file.     Social History Main Topics    Smoking status: Former Smoker     Packs/day: 0.25     Years: 25.00     Quit date: 2018    Smokeless tobacco: Never Used    Alcohol use No    Drug use: No    Sexual activity: Not on file     Other Topics Concern    Not on file     Social History Narrative    No narrative on file     Review of patient's allergies indicates:   Allergen Reactions    No known drug allergies        Current Outpatient Medications   Medication Sig     amLODIPine (NORVASC) 2.5 MG tablet Take 1 tablet (2.5 mg total) by mouth nightly.    black cohosh root extract 40 mg Cap Take 40 mg by mouth 2 (two) times daily as needed.    clobetasoL (TEMOVATE) 0.05 % external solution Apply topically.    fluticasone propionate (FLONASE) 50 mcg/actuation nasal spray 1 spray (50 mcg total) by Each Nostril route 2 (two) times a day.    levocetirizine (XYZAL) 5 MG tablet Take 1 tablet (5 mg total) by mouth every evening.    montelukast (SINGULAIR) 10 mg tablet Take 10 mg by mouth every evening.    propranoloL (INDERAL) 20 MG tablet Take 1 tablet (20 mg total) by mouth every morning.    rosuvastatin (CRESTOR) 10 MG tablet Take 1 tablet (10 mg total) by mouth every evening.    zaleplon (SONATA) 10 MG capsule Take 1 capsule (10 mg total) by mouth nightly as needed.    topiramate (TOPAMAX) 15 MG capsule Take 1 po nightly for 1 wk, then 1 po bid for 1 wk, then 2 po nightly and 1 po in AM, and finally the goal dose of 2 po bid, for a total of 30 mg bid     No current facility-administered medications for this visit.           Objective:      Vitals:    06/19/24 1010   BP: (!) 161/98   Pulse: 68   Resp:      Body mass index is 29.99 kg/m².      Physical Exam    Constitutional:   She appears well-developed and well-nourished. She is well groomed    HENT:    Head: Atraumatic, oral and nasal mucosa intact  Eyes: Conjunctivae and EOM are normal. Pupils are equal, round, and reactive to light OU  Neck: Neck supple. No thyromegaly present  Cardiovascular: Normal rate and normal heart sounds  No murmur heard  Pulmonary/Chest: Effort normal and breath sounds normal  Musculoskeletal: Normal range of motion. No joint stiffness. No vertebral point tenderness  Skin: Skin is warm and dry  Psychiatric: Normal mood and affect     Neuro exam:    Mental status:  Awake, attentive, Alert, oriented to self, place, year and month  Language function is intact    Cranial Nerves:  Smell was not formally  evaluated  Cranial Nerves II - XII: intact  Pursuits were smooth, normal saccades, no nystagmus OU  Motor - facial movement was symmetrical and normal     Palate moved well and was symmetrical with normal palatal and oral sensation  Tongue movements were full    Coordination:     Rapid alternating movements and rapid finger tapping - normal bilaterally  Finger to nose - normal and symmetric bilaterally   Heel to shin test - normal and symmetric bilaterally   Arm roll - smooth and symmetric   No intentional or positional tremor.     Motor:  Normal muscle bulk and symmetry. No fasciculations were noted    No pronator drift  Strength 5/5 bilaterally     Reflexes:  Tendon reflexes were 3 + at biceps, triceps, brachioradialis, patellar, and Achilles bilaterally  On plantar stimulation toes were down going bilaterally  No clonus was noted     Sensory:Decreased 20% to pp on the left hemibody including the face.     Gait: Romberg absent. Normal gait. Normal arm swing and turns. Good tandem    Review of Data:  Lab Results   Component Value Date     01/05/2024     06/18/2015    K 4.2 01/05/2024    K 4.2 06/18/2015    MG 2.1 06/18/2015     01/05/2024     06/18/2015    CO2 23 01/05/2024    BUN 19 (H) 01/05/2024    CREATININE 0.84 01/05/2024    CREATININE 0.72 06/18/2015    GLU 98 01/05/2024    HGBA1C 5.0 01/05/2024    AST 33 01/05/2024    AST 20 03/21/2016    ALT 58 (H) 01/05/2024    ALBUMIN 4.6 01/05/2024    ALBUMIN 4.3 06/18/2015    PROT 7.5 01/05/2024    BILITOT 0.9 01/05/2024    CHOL 279 (H) 01/05/2024    HDL 80 (H) 01/05/2024    LDLCALC 166.4 (H) 01/05/2024    LDLCALC 132 (H) 10/28/2022    TRIG 163 (H) 01/05/2024       Lab Results   Component Value Date    WBC 4.35 01/05/2024    HGB 13.6 01/05/2024    HCT 41.8 01/05/2024    MCV 87 01/05/2024     01/05/2024       Lab Results   Component Value Date    TSH 0.829 01/05/2024         I reviewed images of MRA and MRI of the brain and shared the  images with the patient. There are multiple foci of white matter lesions likely representing gliosis.  Hypercoag work up, also entirely negative      Assessment and Plan   The patient does not meet criteria for vestibular migraine at this time. While she does have vestibular symptoms, she never has migraine pain or photophobia, phonophobia, or nausea. This is required for at least half of the episodes in order to meet criteria. No benefit from vestibular therapy.     She has a negative cardiac work up. Hypercoagulable work up entirely negative  Hypertension, on Amlodipine and Propranolol    Agree with Vestibular therapy, per ENT.   WeatherX foor air travel and atmospheric pressure changes headaches  Low dose Topamax, 15 mg, not tolerated. Stopped after 4 days              Loree Bhandari M.D  Medical Director, Headache and Facial Pain  [facility]

## 2024-06-20 ENCOUNTER — CLINICAL SUPPORT (OUTPATIENT)
Dept: AUDIOLOGY | Facility: CLINIC | Age: 54
End: 2024-06-20
Payer: COMMERCIAL

## 2024-06-20 DIAGNOSIS — R42 DIZZINESS: ICD-10-CM

## 2024-06-20 DIAGNOSIS — H93.11 TINNITUS OF RIGHT EAR: ICD-10-CM

## 2024-06-20 DIAGNOSIS — H91.93 HIGH FREQUENCY HEARING LOSS OF BOTH EARS: Primary | ICD-10-CM

## 2024-06-20 PROCEDURE — 92567 TYMPANOMETRY: CPT | Mod: S$GLB,,, | Performed by: AUDIOLOGIST

## 2024-06-20 PROCEDURE — 92557 COMPREHENSIVE HEARING TEST: CPT | Mod: S$GLB,,, | Performed by: AUDIOLOGIST

## 2024-06-21 ENCOUNTER — OFFICE VISIT (OUTPATIENT)
Dept: OTOLARYNGOLOGY | Facility: CLINIC | Age: 54
End: 2024-06-21
Payer: COMMERCIAL

## 2024-06-21 ENCOUNTER — PATIENT MESSAGE (OUTPATIENT)
Dept: OTOLARYNGOLOGY | Facility: CLINIC | Age: 54
End: 2024-06-21

## 2024-06-21 DIAGNOSIS — R26.89 BALANCE PROBLEMS: ICD-10-CM

## 2024-06-21 DIAGNOSIS — H81.4 VERTIGO OF CENTRAL ORIGIN: Primary | ICD-10-CM

## 2024-06-21 PROCEDURE — 3044F HG A1C LEVEL LT 7.0%: CPT | Mod: CPTII,95,, | Performed by: OTOLARYNGOLOGY

## 2024-06-21 PROCEDURE — 99213 OFFICE O/P EST LOW 20 MIN: CPT | Mod: 95,,, | Performed by: OTOLARYNGOLOGY

## 2024-06-22 NOTE — PROGRESS NOTES
Subjective     Patient ID: Jomar Mercer is a 54 y.o. female.    Chief Complaint: No chief complaint on file.      The patient location is: [place], [place]  The chief complaint leading to consultation is: follow up    Visit type: audiovisual    Face to Face time with patient:   30 minutes of total time spent on the encounter, which includes face to face time and non-face to face time preparing to see the patient (eg, review of tests), Obtaining and/or reviewing separately obtained history, Documenting clinical information in the electronic or other health record, Independently interpreting results (not separately reported) and communicating results to the patient/family/caregiver, or Care coordination (not separately reported).         Each patient to whom he or she provides medical services by telemedicine is:  (1) informed of the relationship between the physician and patient and the respective role of any other health care provider with respect to management of the patient; and (2) notified that he or she may decline to receive medical services by telemedicine and may withdraw from such care at any time.    Notes:      HPI     Jomar Mercer is a 54 y.o. female here to follow-up reports right ear sx have resolved after a removal of tube and healing up of her TM.  Denies any other sx that would fit with superior canal dehsicence. She still has balance problems.  Saw neurology who felt she does not have VM.  Tried vestibular therapy for 2 sessions but felt no improvement.     Review of Systems   Constitutional: Negative.    HENT:  Positive for hearing loss and postnasal drip.    Eyes:  Positive for itching.   Respiratory: Negative.     Cardiovascular: Negative.    Endocrine: Negative.    Genitourinary: Negative.    Musculoskeletal: Negative.    Integumentary:  Negative.   Allergic/Immunologic: Positive for food allergies.   Neurological:  Positive for dizziness and headaches.   Hematological: Negative.     Psychiatric/Behavioral:  Positive for sleep disturbance.           Objective     Physical Exam  Constitutional:       Appearance: Normal appearance.   Neurological:      Mental Status: She is alert.     Data reviewed:       Audiogram shows right-sided conductive hearing loss with no super normal bone thresholds flat tympanogram due to tube    CT temporal bones image independently reviewed by me and show:   Right dehiscence of SSC, Left thinning of SSC           VEMP:    VEMP Evaluation     Ms. Jomar Mercer was seen for a VEMP evaluation today.        oVEMP testing was completed. Stimuli were presented at 97 dBnHL for each ear. No repeatable responses were obtained at 97 dBnHL at 500 Hz or 4000 Hz for either ear.  cVEMP testing was completed. Stimuli were presented at 95 dBnHL and 75 dBnHL for each ear. No repeatable responses were obtained at 95 dBnHL or 55 dBnHL at 500 Hz for either ea        Assessment and Plan     1. Vertigo of central origin  -     MRI IAC/Temporal Bones W W/O Contrast; Future; Expected date: 06/21/2024    2. Balance problems        Not convinced patients symptoms are due to SCDS at this time based on lack of autophony, VEMP findings and no supranormal bone thresholds.     Vestibular migraine may also cause ear fullness and is much more common a problem then SCD, pt certainly does not meet definite ICVD criteria, however this does not mean sx are not VM just implies it is not definite    Recommend reconsider VRT. Discussed may take 6 weeks to see a benefit.      Will get MRI IAC to r/o vesibular schwannoma but feel this is unlikely    F/u in 4 mo       No follow-ups on file.

## 2024-06-23 NOTE — PROGRESS NOTES
The patient was referred by Dr. Thien French for a three month recheck of hearing. The patient is being evaluated medically for dizziness.    The patient was seen at [facility] for a hearing evaluation in March of this year. At that time, results indicated a conductive hearing loss and Type B tympanogram in the right ear.     Today the patient was seen for a hearing recheck.    Otoscopic screening revealed a clear view of TM AU    A hearing evaluation was performed today. Test results indicated mild high frequency hearing loss bilaterally. Impedance testing showed a Type A tympanogram in each ear, consistent with normal middle ear function.    The recommendations were as follows:    (1)  Medical follow up for the dizziness  (2)  Ear protection in loud noise   (3)  Hearing evaluation in one year or sooner if hearing decrease is noted       Today's test results and recommendations were discussed with the patient.

## 2024-09-10 RX ORDER — FLUTICASONE PROPIONATE 50 MCG
SPRAY, SUSPENSION (ML) NASAL
Qty: 48 G | Refills: 3 | Status: SHIPPED | OUTPATIENT
Start: 2024-09-10

## 2024-09-17 ENCOUNTER — PATIENT MESSAGE (OUTPATIENT)
Dept: NEUROLOGY | Facility: CLINIC | Age: 54
End: 2024-09-17
Payer: COMMERCIAL

## 2024-09-24 ENCOUNTER — PATIENT MESSAGE (OUTPATIENT)
Dept: OTOLARYNGOLOGY | Facility: CLINIC | Age: 54
End: 2024-09-24
Payer: COMMERCIAL

## 2024-10-14 ENCOUNTER — PATIENT MESSAGE (OUTPATIENT)
Dept: OTOLARYNGOLOGY | Facility: CLINIC | Age: 54
End: 2024-10-14
Payer: COMMERCIAL

## 2024-11-18 ENCOUNTER — PATIENT MESSAGE (OUTPATIENT)
Dept: OTOLARYNGOLOGY | Facility: CLINIC | Age: 54
End: 2024-11-18
Payer: COMMERCIAL

## 2024-12-03 ENCOUNTER — OFFICE VISIT (OUTPATIENT)
Dept: OTOLARYNGOLOGY | Facility: CLINIC | Age: 54
End: 2024-12-03
Payer: COMMERCIAL

## 2024-12-03 DIAGNOSIS — H83.8X1 SUPERIOR SEMICIRCULAR CANAL DEHISCENCE OF RIGHT EAR: Primary | ICD-10-CM

## 2024-12-03 DIAGNOSIS — H81.4 VERTIGO OF CENTRAL ORIGIN: ICD-10-CM

## 2024-12-03 PROCEDURE — 99999 PR PBB SHADOW E&M-EST. PATIENT-LVL III: CPT | Mod: PBBFAC,,, | Performed by: OTOLARYNGOLOGY

## 2024-12-03 PROCEDURE — 99213 OFFICE O/P EST LOW 20 MIN: CPT | Mod: S$GLB,,, | Performed by: OTOLARYNGOLOGY

## 2024-12-03 PROCEDURE — 3044F HG A1C LEVEL LT 7.0%: CPT | Mod: CPTII,S$GLB,, | Performed by: OTOLARYNGOLOGY

## 2024-12-03 PROCEDURE — 1159F MED LIST DOCD IN RCRD: CPT | Mod: CPTII,S$GLB,, | Performed by: OTOLARYNGOLOGY

## 2024-12-03 NOTE — PROGRESS NOTES
Subjective     Patient ID: Jomar Mercer is a 54 y.o. female.    Chief Complaint: Follow-up      Follow-up  Associated symptoms include headaches.        Jomar Mercer is a 54 y.o. female with SCD on right and hx of head motion and visual vertigo.  Currently right ear does not bother her. She has questions about my thoughts on use of Klonopin to control her symptoms.  She is currently purchasing a home and reports increased stress and anxiety related to this.  She is only having dizziness every once in a while at this point.  In wants to know what is a good long-term control medication for her anxiety or dizziness    Review of Systems   Constitutional: Negative.    HENT:  Positive for hearing loss, postnasal drip and sinus pressure/congestion.    Eyes:  Positive for itching.   Respiratory: Negative.     Cardiovascular: Negative.    Endocrine: Negative.    Genitourinary: Negative.    Musculoskeletal: Negative.    Integumentary:  Negative.   Allergic/Immunologic: Positive for food allergies.   Neurological:  Positive for dizziness and headaches.   Hematological: Negative.    Psychiatric/Behavioral:  Positive for sleep disturbance.           Objective     Physical Exam  Constitutional:       Appearance: Normal appearance.   Neurological:      Mental Status: She is alert.     Data reviewed:       Audiogram shows right-sided conductive hearing loss with no super normal bone thresholds flat tympanogram due to tube    CT temporal bones image independently reviewed by me and show:   Right dehiscence of SSC, Left thinning of SSC           VEMP:    VEMP Evaluation     Ms. Jomar Mercer was seen for a VEMP evaluation today.        oVEMP testing was completed. Stimuli were presented at 97 dBnHL for each ear. No repeatable responses were obtained at 97 dBnHL at 500 Hz or 4000 Hz for either ear.  cVEMP testing was completed. Stimuli were presented at 95 dBnHL and 75 dBnHL for each ear. 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 Vertigo of central origin        Not convinced patients symptoms are due to SCDS at this time based on lack of autophony, VEMP findings and no supranormal bone thresholds.     Discussed that low-dose Klonopin his safe but problem is symptoms may return in increasing doses could lead to dependency and issues with withdrawal.  In general do not recommend benzodiazepines as daily medications.    Discussed for anxiety could consider SNRI or SSRI recommend follow up with PCP or psychiatrist         No follow-ups on file.

## 2024-12-22 DIAGNOSIS — G43.809 VESTIBULAR MIGRAINE: Primary | ICD-10-CM

## 2024-12-23 RX ORDER — CLONAZEPAM 0.5 MG/1
TABLET ORAL
Qty: 20 TABLET | Refills: 1 | Status: SHIPPED | OUTPATIENT
Start: 2024-12-23

## 2025-03-26 ENCOUNTER — PATIENT MESSAGE (OUTPATIENT)
Dept: OTOLARYNGOLOGY | Facility: CLINIC | Age: 55
End: 2025-03-26
Payer: COMMERCIAL